# Patient Record
Sex: MALE | Race: BLACK OR AFRICAN AMERICAN | NOT HISPANIC OR LATINO | Employment: OTHER | URBAN - METROPOLITAN AREA
[De-identification: names, ages, dates, MRNs, and addresses within clinical notes are randomized per-mention and may not be internally consistent; named-entity substitution may affect disease eponyms.]

---

## 2023-05-26 PROBLEM — I25.5 CARDIOMYOPATHY, ISCHEMIC: Status: ACTIVE | Noted: 2023-05-26

## 2023-05-26 PROBLEM — R09.81 NASAL CONGESTION: Status: ACTIVE | Noted: 2023-05-26

## 2023-05-26 PROBLEM — G56.01 ACUTE CARPAL TUNNEL SYNDROME, RIGHT: Status: ACTIVE | Noted: 2023-05-26

## 2023-05-26 PROBLEM — E03.9 HYPOTHYROIDISM: Status: ACTIVE | Noted: 2023-05-26

## 2023-05-26 PROBLEM — G47.00 INSOMNIA: Status: ACTIVE | Noted: 2023-05-26

## 2023-05-26 PROBLEM — J44.9 COPD (CHRONIC OBSTRUCTIVE PULMONARY DISEASE) (MULTI): Status: ACTIVE | Noted: 2023-05-26

## 2023-05-26 PROBLEM — G47.30 APNEA, SLEEP: Status: ACTIVE | Noted: 2023-05-26

## 2023-05-26 PROBLEM — H90.3 BILATERAL SENSORINEURAL HEARING LOSS: Status: ACTIVE | Noted: 2023-05-26

## 2023-05-26 PROBLEM — R20.2 TINGLING: Status: ACTIVE | Noted: 2023-05-26

## 2023-05-26 PROBLEM — M50.90 DISORDER OF INTERVERTEBRAL DISC OF CERVICAL SPINE: Status: ACTIVE | Noted: 2023-05-26

## 2023-05-26 PROBLEM — R60.9 EDEMA: Status: ACTIVE | Noted: 2023-05-26

## 2023-05-26 PROBLEM — I25.119 ATHEROSCLEROSIS OF NATIVE CORONARY ARTERY OF NATIVE HEART WITH ANGINA PECTORIS (CMS-HCC): Status: ACTIVE | Noted: 2023-05-26

## 2023-05-26 PROBLEM — E11.42 DIABETIC POLYNEUROPATHY ASSOCIATED WITH TYPE 2 DIABETES MELLITUS (MULTI): Status: ACTIVE | Noted: 2023-05-26

## 2023-05-26 PROBLEM — R20.0 ARM NUMBNESS: Status: ACTIVE | Noted: 2023-05-26

## 2023-05-26 PROBLEM — I25.10 CORONARY ARTERY DISEASE: Status: ACTIVE | Noted: 2023-05-26

## 2023-05-26 PROBLEM — R20.2 PARESTHESIA OF BOTH HANDS: Status: ACTIVE | Noted: 2023-05-26

## 2023-05-26 PROBLEM — I10 ESSENTIAL HYPERTENSION: Status: ACTIVE | Noted: 2023-05-26

## 2023-05-26 PROBLEM — N18.30 CHRONIC KIDNEY DISEASE (CKD), STAGE III (MODERATE) (MULTI): Status: ACTIVE | Noted: 2023-05-26

## 2023-05-26 PROBLEM — R29.818 SUSPECTED SLEEP APNEA: Status: ACTIVE | Noted: 2023-05-26

## 2023-05-26 PROBLEM — M54.12 LEFT CERVICAL RADICULOPATHY: Status: ACTIVE | Noted: 2023-05-26

## 2023-05-26 PROBLEM — E05.00 GRAVES' ORBITOPATHY: Status: ACTIVE | Noted: 2023-05-26

## 2023-05-26 PROBLEM — E66.09 OBESITY DUE TO EXCESS CALORIES: Status: ACTIVE | Noted: 2023-05-26

## 2023-05-26 PROBLEM — R43.8 DECREASED SENSE OF SMELL: Status: ACTIVE | Noted: 2023-05-26

## 2023-05-26 PROBLEM — M47.12 CERVICAL SPONDYLOSIS WITH MYELOPATHY: Status: ACTIVE | Noted: 2023-05-26

## 2023-05-26 PROBLEM — E55.9 VITAMIN D DEFICIENCY: Status: ACTIVE | Noted: 2023-05-26

## 2023-05-26 PROBLEM — R41.3 MEMORY LOSS: Status: ACTIVE | Noted: 2023-05-26

## 2023-05-26 PROBLEM — J34.2 DEVIATED NASAL SEPTUM: Status: ACTIVE | Noted: 2023-05-26

## 2023-05-26 PROBLEM — E11.9 DIABETES MELLITUS (MULTI): Status: ACTIVE | Noted: 2023-05-26

## 2023-05-26 PROBLEM — M54.9 BACK PAIN, CHRONIC: Status: ACTIVE | Noted: 2023-05-26

## 2023-05-26 PROBLEM — E78.5 HYPERLIPIDEMIA: Status: ACTIVE | Noted: 2023-05-26

## 2023-05-26 PROBLEM — J34.89 NASAL OBSTRUCTION: Status: ACTIVE | Noted: 2023-05-26

## 2023-05-26 PROBLEM — R93.0 ABNORMAL MRI OF HEAD: Status: ACTIVE | Noted: 2023-05-26

## 2023-05-26 PROBLEM — N52.9 ORGANIC IMPOTENCE: Status: ACTIVE | Noted: 2023-05-26

## 2023-05-26 PROBLEM — G95.89 CERVICAL CORD MYELOMALACIA (MULTI): Status: ACTIVE | Noted: 2023-05-26

## 2023-05-26 PROBLEM — G89.29 BACK PAIN, CHRONIC: Status: ACTIVE | Noted: 2023-05-26

## 2023-05-26 PROBLEM — I20.9 ANGINA PECTORIS (CMS-HCC): Status: ACTIVE | Noted: 2023-05-26

## 2023-05-26 PROBLEM — J31.0 CHRONIC RHINITIS: Status: ACTIVE | Noted: 2023-05-26

## 2023-05-26 PROBLEM — N28.9 RENAL INSUFFICIENCY: Status: ACTIVE | Noted: 2023-05-26

## 2023-05-26 PROBLEM — R06.02 SOB (SHORTNESS OF BREATH): Status: ACTIVE | Noted: 2023-05-26

## 2023-05-26 PROBLEM — R06.00 DYSPNEA: Status: ACTIVE | Noted: 2023-05-26

## 2023-05-26 RX ORDER — LISINOPRIL 20 MG/1
1 TABLET ORAL DAILY
COMMUNITY
Start: 2012-10-05 | End: 2023-05-31 | Stop reason: SDUPTHER

## 2023-05-26 RX ORDER — METFORMIN HYDROCHLORIDE 500 MG/1
1 TABLET, EXTENDED RELEASE ORAL
COMMUNITY
Start: 2012-10-22 | End: 2023-05-31 | Stop reason: SDUPTHER

## 2023-05-26 RX ORDER — ALBUTEROL SULFATE 90 UG/1
2 AEROSOL, METERED RESPIRATORY (INHALATION) EVERY 6 HOURS PRN
COMMUNITY
Start: 2018-11-07 | End: 2023-05-31 | Stop reason: SDUPTHER

## 2023-05-26 RX ORDER — ALBUTEROL SULFATE 0.83 MG/ML
2.5 SOLUTION RESPIRATORY (INHALATION) EVERY 8 HOURS PRN
COMMUNITY
Start: 2018-11-27 | End: 2023-05-31 | Stop reason: SDUPTHER

## 2023-05-26 RX ORDER — LEVOTHYROXINE SODIUM 50 UG/1
1 TABLET ORAL DAILY
COMMUNITY
Start: 2013-07-09 | End: 2023-05-31 | Stop reason: SDUPTHER

## 2023-05-26 RX ORDER — EZETIMIBE 10 MG/1
1 TABLET ORAL DAILY
COMMUNITY
Start: 2019-10-29 | End: 2023-05-31 | Stop reason: SDUPTHER

## 2023-05-26 RX ORDER — NAPROXEN SODIUM 220 MG/1
1 TABLET, FILM COATED ORAL DAILY
COMMUNITY
Start: 2021-02-02

## 2023-05-26 RX ORDER — METOPROLOL SUCCINATE 50 MG/1
1 TABLET, EXTENDED RELEASE ORAL DAILY
COMMUNITY
Start: 2013-04-16 | End: 2023-05-31 | Stop reason: SDUPTHER

## 2023-05-26 RX ORDER — MINERAL OIL
180 ENEMA (ML) RECTAL DAILY
COMMUNITY
Start: 2023-02-15

## 2023-05-26 RX ORDER — FUROSEMIDE 20 MG/1
1 TABLET ORAL DAILY
COMMUNITY
Start: 2014-08-27 | End: 2023-05-31 | Stop reason: SDUPTHER

## 2023-05-26 RX ORDER — ATORVASTATIN CALCIUM 80 MG/1
1 TABLET, FILM COATED ORAL DAILY
COMMUNITY
Start: 2013-04-16 | End: 2023-05-31 | Stop reason: SDUPTHER

## 2023-05-26 RX ORDER — GABAPENTIN 300 MG/1
1 CAPSULE ORAL 2 TIMES DAILY
COMMUNITY
Start: 2021-11-29 | End: 2023-05-31 | Stop reason: SDUPTHER

## 2023-05-26 RX ORDER — DULOXETIN HYDROCHLORIDE 30 MG/1
1 CAPSULE, DELAYED RELEASE ORAL DAILY
COMMUNITY
Start: 2023-02-15 | End: 2023-05-31 | Stop reason: ALTCHOICE

## 2023-05-31 ENCOUNTER — LAB (OUTPATIENT)
Dept: LAB | Facility: LAB | Age: 83
End: 2023-05-31
Payer: MEDICARE

## 2023-05-31 ENCOUNTER — OFFICE VISIT (OUTPATIENT)
Dept: PRIMARY CARE | Facility: CLINIC | Age: 83
End: 2023-05-31
Payer: MEDICARE

## 2023-05-31 VITALS — WEIGHT: 225 LBS | SYSTOLIC BLOOD PRESSURE: 94 MMHG | DIASTOLIC BLOOD PRESSURE: 62 MMHG | BODY MASS INDEX: 37.44 KG/M2

## 2023-05-31 DIAGNOSIS — J44.9 CHRONIC OBSTRUCTIVE PULMONARY DISEASE, UNSPECIFIED COPD TYPE (MULTI): ICD-10-CM

## 2023-05-31 DIAGNOSIS — E11.9 TYPE 2 DIABETES MELLITUS WITHOUT COMPLICATION, WITHOUT LONG-TERM CURRENT USE OF INSULIN (MULTI): Primary | ICD-10-CM

## 2023-05-31 DIAGNOSIS — F03.90 DEMENTIA, UNSPECIFIED DEMENTIA SEVERITY, UNSPECIFIED DEMENTIA TYPE, UNSPECIFIED WHETHER BEHAVIORAL, PSYCHOTIC, OR MOOD DISTURBANCE OR ANXIETY (MULTI): ICD-10-CM

## 2023-05-31 DIAGNOSIS — E78.5 HYPERLIPIDEMIA, UNSPECIFIED HYPERLIPIDEMIA TYPE: ICD-10-CM

## 2023-05-31 DIAGNOSIS — I10 PRIMARY HYPERTENSION: ICD-10-CM

## 2023-05-31 DIAGNOSIS — E11.9 TYPE 2 DIABETES MELLITUS WITHOUT COMPLICATION, WITHOUT LONG-TERM CURRENT USE OF INSULIN (MULTI): ICD-10-CM

## 2023-05-31 DIAGNOSIS — F32.A DEPRESSION, UNSPECIFIED DEPRESSION TYPE: ICD-10-CM

## 2023-05-31 DIAGNOSIS — E03.8 OTHER SPECIFIED HYPOTHYROIDISM: ICD-10-CM

## 2023-05-31 DIAGNOSIS — I50.20 HFREF (HEART FAILURE WITH REDUCED EJECTION FRACTION) (MULTI): ICD-10-CM

## 2023-05-31 LAB
ALANINE AMINOTRANSFERASE (SGPT) (U/L) IN SER/PLAS: 9 U/L (ref 10–52)
ALBUMIN (G/DL) IN SER/PLAS: 4.1 G/DL (ref 3.4–5)
ALBUMIN (MG/L) IN URINE: 8.1 MG/L
ALBUMIN/CREATININE (UG/MG) IN URINE: 6.2 UG/MG CRT (ref 0–30)
ALKALINE PHOSPHATASE (U/L) IN SER/PLAS: 84 U/L (ref 33–136)
ANION GAP IN SER/PLAS: 12 MMOL/L (ref 10–20)
ASPARTATE AMINOTRANSFERASE (SGOT) (U/L) IN SER/PLAS: 13 U/L (ref 9–39)
BILIRUBIN TOTAL (MG/DL) IN SER/PLAS: 0.3 MG/DL (ref 0–1.2)
CALCIUM (MG/DL) IN SER/PLAS: 9.5 MG/DL (ref 8.6–10.6)
CARBON DIOXIDE, TOTAL (MMOL/L) IN SER/PLAS: 29 MMOL/L (ref 21–32)
CHLORIDE (MMOL/L) IN SER/PLAS: 106 MMOL/L (ref 98–107)
CREATININE (MG/DL) IN SER/PLAS: 1.31 MG/DL (ref 0.5–1.3)
CREATININE (MG/DL) IN URINE: 130 MG/DL (ref 20–370)
ESTIMATED AVERAGE GLUCOSE FOR HBA1C: 157 MG/DL
GFR MALE: 55 ML/MIN/1.73M2
GLUCOSE (MG/DL) IN SER/PLAS: 104 MG/DL (ref 74–99)
HEMOGLOBIN A1C/HEMOGLOBIN TOTAL IN BLOOD: 7.1 %
POTASSIUM (MMOL/L) IN SER/PLAS: 4.9 MMOL/L (ref 3.5–5.3)
PROTEIN TOTAL: 7.1 G/DL (ref 6.4–8.2)
SODIUM (MMOL/L) IN SER/PLAS: 142 MMOL/L (ref 136–145)
UREA NITROGEN (MG/DL) IN SER/PLAS: 28 MG/DL (ref 6–23)

## 2023-05-31 PROCEDURE — 1159F MED LIST DOCD IN RCRD: CPT | Performed by: INTERNAL MEDICINE

## 2023-05-31 PROCEDURE — 82570 ASSAY OF URINE CREATININE: CPT

## 2023-05-31 PROCEDURE — 3074F SYST BP LT 130 MM HG: CPT | Performed by: INTERNAL MEDICINE

## 2023-05-31 PROCEDURE — 80053 COMPREHEN METABOLIC PANEL: CPT

## 2023-05-31 PROCEDURE — 82043 UR ALBUMIN QUANTITATIVE: CPT

## 2023-05-31 PROCEDURE — 83036 HEMOGLOBIN GLYCOSYLATED A1C: CPT

## 2023-05-31 PROCEDURE — 3078F DIAST BP <80 MM HG: CPT | Performed by: INTERNAL MEDICINE

## 2023-05-31 PROCEDURE — 99215 OFFICE O/P EST HI 40 MIN: CPT | Performed by: INTERNAL MEDICINE

## 2023-05-31 PROCEDURE — 86592 SYPHILIS TEST NON-TREP QUAL: CPT

## 2023-05-31 PROCEDURE — 36415 COLL VENOUS BLD VENIPUNCTURE: CPT

## 2023-05-31 RX ORDER — METOPROLOL SUCCINATE 50 MG/1
50 TABLET, EXTENDED RELEASE ORAL DAILY
Qty: 90 TABLET | Refills: 1 | Status: SHIPPED | OUTPATIENT
Start: 2023-05-31 | End: 2023-09-27 | Stop reason: SDUPTHER

## 2023-05-31 RX ORDER — DULOXETIN HYDROCHLORIDE 30 MG/1
30 CAPSULE, DELAYED RELEASE ORAL DAILY
Qty: 90 CAPSULE | Refills: 1 | Status: SHIPPED | OUTPATIENT
Start: 2023-05-31 | End: 2023-09-27

## 2023-05-31 RX ORDER — GABAPENTIN 300 MG/1
300 CAPSULE ORAL 2 TIMES DAILY
Qty: 90 CAPSULE | Refills: 1 | Status: SHIPPED | OUTPATIENT
Start: 2023-05-31 | End: 2023-09-27 | Stop reason: SDUPTHER

## 2023-05-31 RX ORDER — FUROSEMIDE 20 MG/1
20 TABLET ORAL DAILY PRN
Qty: 90 TABLET | Refills: 1 | Status: SHIPPED | OUTPATIENT
Start: 2023-05-31 | End: 2023-09-27 | Stop reason: SDUPTHER

## 2023-05-31 RX ORDER — ATORVASTATIN CALCIUM 80 MG/1
80 TABLET, FILM COATED ORAL DAILY
Qty: 90 TABLET | Refills: 1 | Status: SHIPPED | OUTPATIENT
Start: 2023-05-31 | End: 2023-09-27 | Stop reason: SDUPTHER

## 2023-05-31 RX ORDER — METFORMIN HYDROCHLORIDE 500 MG/1
500 TABLET, EXTENDED RELEASE ORAL
Qty: 90 TABLET | Refills: 1 | Status: SHIPPED | OUTPATIENT
Start: 2023-05-31 | End: 2023-09-27 | Stop reason: SDUPTHER

## 2023-05-31 RX ORDER — LEVOTHYROXINE SODIUM 50 UG/1
50 TABLET ORAL DAILY
Qty: 90 TABLET | Refills: 1 | Status: SHIPPED | OUTPATIENT
Start: 2023-05-31 | End: 2023-09-27 | Stop reason: SDUPTHER

## 2023-05-31 RX ORDER — EZETIMIBE 10 MG/1
10 TABLET ORAL DAILY
Qty: 90 TABLET | Refills: 1 | Status: SHIPPED | OUTPATIENT
Start: 2023-05-31 | End: 2023-09-27 | Stop reason: SDUPTHER

## 2023-05-31 RX ORDER — ALBUTEROL SULFATE 0.83 MG/ML
2.5 SOLUTION RESPIRATORY (INHALATION) EVERY 8 HOURS PRN
Qty: 75 ML | Refills: 1 | Status: SHIPPED | OUTPATIENT
Start: 2023-05-31

## 2023-05-31 RX ORDER — LISINOPRIL 20 MG/1
20 TABLET ORAL DAILY
Qty: 90 TABLET | Refills: 1 | Status: SHIPPED | OUTPATIENT
Start: 2023-05-31 | End: 2023-09-27 | Stop reason: SDUPTHER

## 2023-05-31 ASSESSMENT — ENCOUNTER SYMPTOMS
SHORTNESS OF BREATH: 0
ABDOMINAL PAIN: 0
COUGH: 0
APPETITE CHANGE: 0
CHEST TIGHTNESS: 0
WHEEZING: 0
ABDOMINAL DISTENTION: 0
ACTIVITY CHANGE: 0

## 2023-05-31 ASSESSMENT — PATIENT HEALTH QUESTIONNAIRE - PHQ9
1. LITTLE INTEREST OR PLEASURE IN DOING THINGS: NOT AT ALL
SUM OF ALL RESPONSES TO PHQ9 QUESTIONS 1 AND 2: 0
2. FEELING DOWN, DEPRESSED OR HOPELESS: NOT AT ALL

## 2023-05-31 NOTE — PROGRESS NOTES
Subjective   Patient ID: Baldev Osorio is a 82 y.o. male who presents for Follow-up and Med Refill.  HPI    Patient presents to the clinic today for a follow up visit. He is doing well. He does mention that over the last few months he has been getting more forgetful. It is primarily his short term memory that has been impacted. He also continues to deal with the chronic numbness and tingling in his arm. He has followed up with the physician who did the procedure who said that this is a chronic thing that he will deal with.     Review of Systems   Constitutional:  Negative for activity change and appetite change.   Respiratory:  Negative for cough, chest tightness, shortness of breath and wheezing.    Cardiovascular:  Negative for chest pain.   Gastrointestinal:  Negative for abdominal distention and abdominal pain.       Objective   BP 94/62   Wt 102 kg (225 lb)   BMI 37.44 kg/m²     Physical Exam  Constitutional:       Appearance: Normal appearance.   Cardiovascular:      Rate and Rhythm: Normal rate and regular rhythm.   Pulmonary:      Effort: Pulmonary effort is normal.      Breath sounds: Normal breath sounds.   Abdominal:      General: Abdomen is flat. Bowel sounds are normal.      Palpations: Abdomen is soft.   Neurological:      Mental Status: He is alert.         Assessment/Plan          # Short Term Memory Loss  - MMSE Score of 17  [ ] RPR  [ ] Neuropsych referral    # HTN  - BP in office: 94/62  - Current Regimen: Lisinopril 20 mg QD, Metoprolol Succinate ER 50 mg every day, Lasix 20 mg QD  - Continue to monitor BP, daily BP's   [ ] Switch Lasix to PRN due to soft BP's  [ ] CMP, Urine Albumin today    # CKD Stage 3a, A1   - Last Cr 1.56, GFR 45 (11/2022)  - Outpatient Nephrologist: Dr. Díaz     # HFmrEF  - ECHO (7/2020): EF 45-50%  - Current Diuretic Regimen: Lasix 20 mg QD  - Beta Blocker: Metoprolol Succinate ER 50 mg QD   - ACE/ARB: Lisinopril 20 mg QD   - Outpatient Cardiologist: Dr. Roe  Sonal   [ ] Switch Lasix to PRN  [ ] ECHO pending      # T2DM  - IO HbA1c (2/2023): 7.0%   - Current Regimen: Metformin HCl  mg QD   [ ] HbA1c today     # Left Arm Numbness/Tingling/Pain  # Elevated PHQ-9 Score   - Has been ongoing for years  - Cervical Laminectomy in January of 2021, pain has persisted  - Currently on Gabapentin 100 mg BID     # HLD  - Last Lipid Panel (8/2022): Cholesterol 111, LDL 49, TG's 77  - Continue Atorvastatin 80 mg QD, Ezetimibe 10 mg QD     # Hypothyroidism  - TSH (10/2022): 2.19  - Continue Levothyroxine 50 mcg QD    # Depression  - Continue Duloxetine 30 mg QD      Labs:  - CBC, CMP, TSH w/ T4, Lipid Panel, Vitamin D, HbA1c (Yearly Labs 9/2023)  - CMP, Urine Albumin, HbA1c due today      Routine Screening:  - Colonoscopy: Last done 6/2017, one 1 mm polyp in the cecum removed, one 5 mm polyp in the sigmoid colon removed, otherwise normal. Repeat in 5-10 years.      Immunizations:   - Pneumococcal: PPSV 2008  - Shingles: none on file, recommended to get this done at pharmacy  - TDAP: none on file  - Influenza: none on file      Please follow up in 3 months.

## 2023-05-31 NOTE — PATIENT INSTRUCTIONS
Thank you for making an appointment with Dr. Sage/Dr. William today.     By optimizing your health through good nutrition, daily exercise, stress management, low/moderate alcohol and avoidance of tobacco, sugar and processed foods, you can help to decrease your risk of cardiovascular disease and stroke and achieve a healthy weight. A diet rich in whole, plant-based foods such as vegetables, beans, seeds, nuts, whole grains, healthy fats and fruit - leads to lower body mass index, blood pressure, HbA1C, and cholesterol levels.     Please continue to take medications as prescribed.     Please have blood work done, thanks!

## 2023-05-31 NOTE — PROGRESS NOTES
I reviewed with the resident the medical history and the resident’s findings on physical examination.  I discussed with the resident the patient’s diagnosis and concur with the treatment plan as documented in the resident note.     I saw and evaluated the patient. I personally obtained the key and critical portions of the history and physical exam or was physically present for key and critical portions performed by the trainee. I reviewed the trainee's documentation and discussed the patient with the trainee. I agree with the trainee's medical decision making, as documented on the trainee's note.     Nataliia Richardson MD

## 2023-06-01 LAB — RPR MONITORING: NONREACTIVE

## 2023-06-20 DIAGNOSIS — E11.9 TYPE 2 DIABETES MELLITUS WITHOUT COMPLICATION, WITHOUT LONG-TERM CURRENT USE OF INSULIN (MULTI): ICD-10-CM

## 2023-06-20 RX ORDER — BLOOD SUGAR DIAGNOSTIC
1 STRIP MISCELLANEOUS DAILY
COMMUNITY
End: 2023-06-20 | Stop reason: SDUPTHER

## 2023-06-20 RX ORDER — LANCETS 26 GAUGE
1 EACH MISCELLANEOUS DAILY
Qty: 90 EACH | Refills: 3 | Status: SHIPPED | OUTPATIENT
Start: 2023-06-20 | End: 2023-09-27 | Stop reason: SDUPTHER

## 2023-06-20 RX ORDER — LANCETS 26 GAUGE
1 EACH MISCELLANEOUS DAILY
COMMUNITY
End: 2023-06-20 | Stop reason: SDUPTHER

## 2023-06-20 RX ORDER — BLOOD SUGAR DIAGNOSTIC
1 STRIP MISCELLANEOUS DAILY
Qty: 100 STRIP | Refills: 3 | Status: SHIPPED | OUTPATIENT
Start: 2023-06-20 | End: 2023-09-27 | Stop reason: SDUPTHER

## 2023-06-20 RX ORDER — DEXTROSE 4 G
TABLET,CHEWABLE ORAL
Qty: 1 EACH | Refills: 0 | Status: SHIPPED | OUTPATIENT
Start: 2023-06-20 | End: 2023-09-27 | Stop reason: SDUPTHER

## 2023-09-13 ENCOUNTER — TELEPHONE (OUTPATIENT)
Dept: PHARMACY | Facility: HOSPITAL | Age: 83
End: 2023-09-13
Payer: MEDICARE

## 2023-09-14 ENCOUNTER — TELEPHONE (OUTPATIENT)
Dept: PHARMACY | Facility: HOSPITAL | Age: 83
End: 2023-09-14
Payer: MEDICARE

## 2023-09-14 NOTE — TELEPHONE ENCOUNTER
Population Health: Outreach by Ambulatory Pharmacy Team    Payor: [employer]  Reason: Adherence  Medication: Metformin 500mg, Atorvastatin 80mg, and Lisinopril 20mg  Outcome: Patient Reached: Claims Adherence, patient still has medication and has it mailed to them when needed, no issues    HERBERT GANN, PharmD

## 2023-09-15 PROBLEM — M47.22 CERVICAL SPONDYLOSIS WITH RADICULOPATHY: Status: ACTIVE | Noted: 2021-01-25

## 2023-09-15 PROBLEM — E66.9 OBESITY: Status: ACTIVE | Noted: 2021-01-25

## 2023-09-15 PROBLEM — E78.5 HLD (HYPERLIPIDEMIA): Status: ACTIVE | Noted: 2021-01-25

## 2023-09-15 PROBLEM — I12.9 HYPERTENSIVE CHRONIC KIDNEY DISEASE W STG 1-4/UNSP CHR KDNY: Status: ACTIVE | Noted: 2021-01-25

## 2023-09-15 PROBLEM — E11.22 TYPE 2 DIABETES MELLITUS WITH DIABETIC CHRONIC KIDNEY DISEASE (MULTI): Status: ACTIVE | Noted: 2021-01-25

## 2023-09-15 PROBLEM — Z87.891 PERSONAL HISTORY OF NICOTINE DEPENDENCE: Status: ACTIVE | Noted: 2021-01-25

## 2023-09-15 PROBLEM — Z95.5 PRESENCE OF CORONARY ANGIOPLASTY IMPLANT AND GRAFT: Status: ACTIVE | Noted: 2021-01-25

## 2023-09-15 PROBLEM — I25.10 ATHSCL HEART DISEASE OF NATIVE CORONARY ARTERY W/O ANG PCTRS: Status: ACTIVE | Noted: 2021-01-25

## 2023-09-15 PROBLEM — I25.2 OLD MYOCARDIAL INFARCTION: Status: ACTIVE | Noted: 2021-01-25

## 2023-09-15 PROBLEM — M54.12 CHRONIC CERVICAL RADICULOPATHY: Status: ACTIVE | Noted: 2023-09-15

## 2023-09-15 PROBLEM — G51.0 BELL'S PALSY: Status: ACTIVE | Noted: 2021-01-11

## 2023-09-15 PROBLEM — Z79.84 LONG TERM (CURRENT) USE OF ORAL HYPOGLYCEMIC DRUGS: Status: ACTIVE | Noted: 2021-01-25

## 2023-09-15 PROBLEM — N18.30 STAGE 3 CHRONIC KIDNEY DISEASE (MULTI): Status: ACTIVE | Noted: 2021-01-25

## 2023-09-15 PROBLEM — I25.5 ISCHEMIC CARDIOMYOPATHY: Status: ACTIVE | Noted: 2021-01-25

## 2023-09-15 PROBLEM — H90.5 SENSORINEURAL HEARING LOSS: Status: ACTIVE | Noted: 2021-01-25

## 2023-09-15 RX ORDER — ALBUTEROL SULFATE 90 UG/1
2 AEROSOL, METERED RESPIRATORY (INHALATION)
COMMUNITY
Start: 2018-11-07

## 2023-09-18 NOTE — TELEPHONE ENCOUNTER
I reviewed the progress note and agree with the resident’s findings and plans as written. Case discussed with resident.    Faraz Kohli, PharmD

## 2023-09-27 ENCOUNTER — OFFICE VISIT (OUTPATIENT)
Dept: PRIMARY CARE | Facility: CLINIC | Age: 83
End: 2023-09-27
Payer: MEDICARE

## 2023-09-27 VITALS — SYSTOLIC BLOOD PRESSURE: 114 MMHG | BODY MASS INDEX: 37.94 KG/M2 | WEIGHT: 228 LBS | DIASTOLIC BLOOD PRESSURE: 66 MMHG

## 2023-09-27 DIAGNOSIS — E78.5 HYPERLIPIDEMIA, UNSPECIFIED HYPERLIPIDEMIA TYPE: ICD-10-CM

## 2023-09-27 DIAGNOSIS — N18.31 STAGE 3A CHRONIC KIDNEY DISEASE (MULTI): ICD-10-CM

## 2023-09-27 DIAGNOSIS — E66.09 CLASS 1 OBESITY DUE TO EXCESS CALORIES IN ADULT, UNSPECIFIED BMI, UNSPECIFIED WHETHER SERIOUS COMORBIDITY PRESENT: Primary | ICD-10-CM

## 2023-09-27 DIAGNOSIS — E66.01 OBESITY, MORBID (MULTI): ICD-10-CM

## 2023-09-27 DIAGNOSIS — I10 PRIMARY HYPERTENSION: ICD-10-CM

## 2023-09-27 DIAGNOSIS — E11.9 TYPE 2 DIABETES MELLITUS WITHOUT COMPLICATION, WITHOUT LONG-TERM CURRENT USE OF INSULIN (MULTI): ICD-10-CM

## 2023-09-27 DIAGNOSIS — I10 ESSENTIAL HYPERTENSION: ICD-10-CM

## 2023-09-27 DIAGNOSIS — E03.8 OTHER SPECIFIED HYPOTHYROIDISM: ICD-10-CM

## 2023-09-27 DIAGNOSIS — N52.1 ERECTILE DYSFUNCTION DUE TO DISEASES CLASSIFIED ELSEWHERE: ICD-10-CM

## 2023-09-27 DIAGNOSIS — E11.22 TYPE 2 DIABETES MELLITUS WITH STAGE 3A CHRONIC KIDNEY DISEASE, WITHOUT LONG-TERM CURRENT USE OF INSULIN (MULTI): ICD-10-CM

## 2023-09-27 DIAGNOSIS — N18.31 TYPE 2 DIABETES MELLITUS WITH STAGE 3A CHRONIC KIDNEY DISEASE, WITHOUT LONG-TERM CURRENT USE OF INSULIN (MULTI): ICD-10-CM

## 2023-09-27 DIAGNOSIS — E78.2 MIXED HYPERLIPIDEMIA: ICD-10-CM

## 2023-09-27 DIAGNOSIS — I50.20 HFREF (HEART FAILURE WITH REDUCED EJECTION FRACTION) (MULTI): ICD-10-CM

## 2023-09-27 PROCEDURE — 1160F RVW MEDS BY RX/DR IN RCRD: CPT | Performed by: INTERNAL MEDICINE

## 2023-09-27 PROCEDURE — 1159F MED LIST DOCD IN RCRD: CPT | Performed by: INTERNAL MEDICINE

## 2023-09-27 PROCEDURE — 99214 OFFICE O/P EST MOD 30 MIN: CPT | Performed by: INTERNAL MEDICINE

## 2023-09-27 PROCEDURE — 3078F DIAST BP <80 MM HG: CPT | Performed by: INTERNAL MEDICINE

## 2023-09-27 PROCEDURE — 3074F SYST BP LT 130 MM HG: CPT | Performed by: INTERNAL MEDICINE

## 2023-09-27 PROCEDURE — 1036F TOBACCO NON-USER: CPT | Performed by: INTERNAL MEDICINE

## 2023-09-27 RX ORDER — LEVOTHYROXINE SODIUM 50 UG/1
50 TABLET ORAL DAILY
Qty: 90 TABLET | Refills: 1 | Status: SHIPPED | OUTPATIENT
Start: 2023-09-27 | End: 2023-10-17 | Stop reason: SDUPTHER

## 2023-09-27 RX ORDER — ATORVASTATIN CALCIUM 80 MG/1
80 TABLET, FILM COATED ORAL DAILY
Qty: 90 TABLET | Refills: 1 | Status: SHIPPED | OUTPATIENT
Start: 2023-09-27

## 2023-09-27 RX ORDER — EZETIMIBE 10 MG/1
10 TABLET ORAL DAILY
Qty: 90 TABLET | Refills: 1 | Status: SHIPPED | OUTPATIENT
Start: 2023-09-27

## 2023-09-27 RX ORDER — BLOOD SUGAR DIAGNOSTIC
1 STRIP MISCELLANEOUS DAILY
Qty: 100 STRIP | Refills: 3 | Status: SHIPPED | OUTPATIENT
Start: 2023-09-27 | End: 2023-10-02 | Stop reason: ALTCHOICE

## 2023-09-27 RX ORDER — LISINOPRIL 20 MG/1
20 TABLET ORAL DAILY
Qty: 90 TABLET | Refills: 1 | Status: SHIPPED | OUTPATIENT
Start: 2023-09-27

## 2023-09-27 RX ORDER — LANCETS 26 GAUGE
1 EACH MISCELLANEOUS DAILY
Qty: 90 EACH | Refills: 3 | Status: SHIPPED | OUTPATIENT
Start: 2023-09-27 | End: 2023-10-02 | Stop reason: ALTCHOICE

## 2023-09-27 RX ORDER — DEXTROSE 4 G
TABLET,CHEWABLE ORAL
Qty: 1 EACH | Refills: 0 | Status: SHIPPED | OUTPATIENT
Start: 2023-09-27 | End: 2023-10-02 | Stop reason: ALTCHOICE

## 2023-09-27 RX ORDER — METFORMIN HYDROCHLORIDE 500 MG/1
500 TABLET, EXTENDED RELEASE ORAL
Qty: 90 TABLET | Refills: 1 | Status: SHIPPED | OUTPATIENT
Start: 2023-09-27

## 2023-09-27 RX ORDER — METOPROLOL SUCCINATE 50 MG/1
50 TABLET, EXTENDED RELEASE ORAL DAILY
Qty: 90 TABLET | Refills: 1 | Status: SHIPPED | OUTPATIENT
Start: 2023-09-27

## 2023-09-27 RX ORDER — GABAPENTIN 300 MG/1
300 CAPSULE ORAL 2 TIMES DAILY
Qty: 90 CAPSULE | Refills: 1 | Status: SHIPPED | OUTPATIENT
Start: 2023-09-27

## 2023-09-27 RX ORDER — FUROSEMIDE 20 MG/1
20 TABLET ORAL DAILY PRN
Qty: 90 TABLET | Refills: 1 | Status: SHIPPED | OUTPATIENT
Start: 2023-09-27

## 2023-09-27 RX ORDER — SILDENAFIL 50 MG/1
25 TABLET, FILM COATED ORAL DAILY PRN
Qty: 12 TABLET | Refills: 3 | Status: SHIPPED | OUTPATIENT
Start: 2023-09-27 | End: 2024-03-15 | Stop reason: SDUPTHER

## 2023-09-27 ASSESSMENT — ENCOUNTER SYMPTOMS
RESPIRATORY NEGATIVE: 1
CONSTITUTIONAL NEGATIVE: 1
GASTROINTESTINAL NEGATIVE: 1
NUMBNESS: 1
CARDIOVASCULAR NEGATIVE: 1

## 2023-09-27 ASSESSMENT — PATIENT HEALTH QUESTIONNAIRE - PHQ9
SUM OF ALL RESPONSES TO PHQ9 QUESTIONS 1 AND 2: 0
1. LITTLE INTEREST OR PLEASURE IN DOING THINGS: NOT AT ALL
2. FEELING DOWN, DEPRESSED OR HOPELESS: NOT AT ALL

## 2023-09-27 NOTE — PATIENT INSTRUCTIONS
Thank you for making an appointment with Dr. Sage/Dr. William today.     By optimizing your health through good nutrition, daily exercise, stress management, low/moderate alcohol and avoidance of tobacco, sugar and processed foods, you can help to decrease your risk of cardiovascular disease and stroke and achieve a healthy weight. A diet rich in whole, plant-based foods such as vegetables, beans, seeds, nuts, whole grains, healthy fats and fruit - leads to lower body mass index, blood pressure, HbA1C, and cholesterol levels.     Please continue to take medications as prescribed.

## 2023-09-27 NOTE — PROGRESS NOTES
I saw and evaluated the patient. I personally obtained the key and critical portions of the history and physical exam or was physically present for key and critical portions performed by the resident/fellow. I reviewed the resident/fellow's documentation and discussed the patient with the resident/fellow. I agree with the resident/fellow's medical decision making as documented in the note.    I saw and evaluated the patient. I personally obtained the key and critical portions of the history and physical exam or was physically present for key and critical portions performed by the trainee. I reviewed the trainee's documentation and discussed the patient with the trainee. I agree with the trainee's medical decision making, as documented on the trainee's note.     Nataliia Richardson MD

## 2023-09-27 NOTE — PROGRESS NOTES
Subjective   Patient ID: Baldev Osorio is a 83 y.o. male who presents for Follow-up.  HPI  Presents to the clinic today for a follow up visit. He states that he is doing well today overall. He is still dealing with some residual pain in his left arm from the surgery he had previously. He has been following up with his Neurosurgeon who states that the only thing they can do is another surgery since it seems to be a nerve issue. He doesn't want to do another surgery so he says he will continue to manage his pain with the medications. The gabapentin he is taking provides symptomatic relief. He has been having some sleeping issues due to the pain at times but overall he is tolerating the pain well.    Review of Systems   Constitutional: Negative.    Respiratory: Negative.     Cardiovascular: Negative.    Gastrointestinal: Negative.    Neurological:  Positive for numbness.       Objective   /66   Wt 103 kg (228 lb)   BMI 37.94 kg/m²     Physical Exam  Cardiovascular:      Rate and Rhythm: Normal rate and regular rhythm.      Pulses: Normal pulses.      Heart sounds: Normal heart sounds.   Pulmonary:      Effort: Pulmonary effort is normal.      Breath sounds: Normal breath sounds.   Abdominal:      General: Abdomen is flat. Bowel sounds are normal.      Palpations: Abdomen is soft.   Neurological:      Mental Status: He is alert.         Assessment/Plan          # HTN  - Current Regimen: Lisinopril 20 mg QD, Metoprolol Succinate ER 50 mg every day  - Continue to monitor BP, daily BP's      # CKD Stage 3a, A1 (Stable)  - Last Cr 1.31, GFR 55 (5/2023)  - Outpatient Nephrologist: Dr. Díaz     # HFmrEF  - ECHO (6/2023): EF 50-55%, impaired relaxation of LV diastolic filling, increase in calculated LVEF from previous.  - Current Diuretic Regimen: Lasix 20 mg PRN due to soft BP's  - Beta Blocker: Metoprolol Succinate ER 50 mg QD   - ACE/ARB: Lisinopril 20 mg QD   - Outpatient Cardiologist: Dr. Bhupinder Pruitt        # T2DM  - IO HbA1c (5/2023): 7.1%   - Current Regimen: Metformin HCl  mg QD      # Left Arm Numbness/Tingling/Pain  # Elevated PHQ-9 Score   - Has been ongoing for years  - Cervical Laminectomy in January of 2021, pain has persisted  - Currently on Gabapentin 300 mg BID     # HLD  - Last Lipid Panel (8/2022): Cholesterol 111, LDL 49, TG's 77  - Continue Atorvastatin 80 mg QD, Ezetimibe 10 mg QD     # Hypothyroidism  - TSH (10/2022): 2.19  - Continue Levothyroxine 50 mcg QD     # Depression  - Continue Duloxetine 30 mg QD      Labs:  - CBC, CMP, TSH w/ T4, Lipid Panel, Vitamin D, HbA1c (Yearly Labs 11/2023)     Routine Screening:  - Colonoscopy: Last done 6/2017, one 1 mm polyp in the cecum removed, one 5 mm polyp in the sigmoid colon removed, otherwise normal. Repeat in 5-10 years.      Immunizations:   - Pneumococcal: PPSV 2008  - Shingles: none on file, recommended to get this done at pharmacy  - TDAP: none on file  - Influenza: none on file      Please follow up in 3 months.

## 2023-09-28 PROBLEM — I25.119 ATHEROSCLEROSIS OF NATIVE CORONARY ARTERY OF NATIVE HEART WITH ANGINA PECTORIS (CMS-HCC): Status: RESOLVED | Noted: 2023-05-26 | Resolved: 2023-09-28

## 2023-09-28 PROBLEM — I20.9 ANGINA PECTORIS (CMS-HCC): Status: RESOLVED | Noted: 2023-05-26 | Resolved: 2023-09-28

## 2023-09-28 PROBLEM — G95.89 CERVICAL CORD MYELOMALACIA (MULTI): Status: RESOLVED | Noted: 2023-05-26 | Resolved: 2023-09-28

## 2023-10-02 DIAGNOSIS — E11.9 TYPE 2 DIABETES MELLITUS WITHOUT COMPLICATION, WITHOUT LONG-TERM CURRENT USE OF INSULIN (MULTI): ICD-10-CM

## 2023-10-02 RX ORDER — BLOOD SUGAR DIAGNOSTIC
1 STRIP MISCELLANEOUS DAILY
Qty: 100 STRIP | Refills: 3 | Status: SHIPPED | OUTPATIENT
Start: 2023-10-02

## 2023-10-02 RX ORDER — LANCETS 30 GAUGE
1 EACH MISCELLANEOUS DAILY
Qty: 1 EACH | Refills: 0 | Status: SHIPPED | OUTPATIENT
Start: 2023-10-02 | End: 2024-10-01

## 2023-10-02 RX ORDER — LANCETS 33 GAUGE
1 EACH MISCELLANEOUS DAILY
Qty: 100 EACH | Refills: 3 | Status: SHIPPED | OUTPATIENT
Start: 2023-10-02

## 2023-10-17 ENCOUNTER — LAB (OUTPATIENT)
Dept: LAB | Facility: LAB | Age: 83
End: 2023-10-17
Payer: MEDICARE

## 2023-10-17 ENCOUNTER — OFFICE VISIT (OUTPATIENT)
Dept: ENDOCRINOLOGY | Facility: CLINIC | Age: 83
End: 2023-10-17
Payer: MEDICARE

## 2023-10-17 VITALS
RESPIRATION RATE: 16 BRPM | BODY MASS INDEX: 37.65 KG/M2 | DIASTOLIC BLOOD PRESSURE: 64 MMHG | HEIGHT: 65 IN | WEIGHT: 226 LBS | SYSTOLIC BLOOD PRESSURE: 122 MMHG | HEART RATE: 68 BPM

## 2023-10-17 DIAGNOSIS — E05.00 GRAVES' EYE DISEASE: ICD-10-CM

## 2023-10-17 DIAGNOSIS — E11.9 TYPE 2 DIABETES MELLITUS WITHOUT COMPLICATION, WITHOUT LONG-TERM CURRENT USE OF INSULIN (MULTI): ICD-10-CM

## 2023-10-17 DIAGNOSIS — E03.9 HYPOTHYROIDISM, UNSPECIFIED TYPE: ICD-10-CM

## 2023-10-17 DIAGNOSIS — E03.8 OTHER SPECIFIED HYPOTHYROIDISM: ICD-10-CM

## 2023-10-17 DIAGNOSIS — E11.9 TYPE 2 DIABETES MELLITUS WITHOUT COMPLICATION, WITHOUT LONG-TERM CURRENT USE OF INSULIN (MULTI): Primary | ICD-10-CM

## 2023-10-17 LAB
ALBUMIN SERPL BCP-MCNC: 4.2 G/DL (ref 3.4–5)
ALP SERPL-CCNC: 82 U/L (ref 33–136)
ALT SERPL W P-5'-P-CCNC: 10 U/L (ref 10–52)
ANION GAP SERPL CALC-SCNC: 14 MMOL/L (ref 10–20)
AST SERPL W P-5'-P-CCNC: 12 U/L (ref 9–39)
BILIRUB SERPL-MCNC: 0.4 MG/DL (ref 0–1.2)
BUN SERPL-MCNC: 18 MG/DL (ref 6–23)
CALCIUM SERPL-MCNC: 9.6 MG/DL (ref 8.6–10.6)
CHLORIDE SERPL-SCNC: 104 MMOL/L (ref 98–107)
CO2 SERPL-SCNC: 29 MMOL/L (ref 21–32)
CREAT SERPL-MCNC: 1.32 MG/DL (ref 0.5–1.3)
EST. AVERAGE GLUCOSE BLD GHB EST-MCNC: 137 MG/DL
GFR SERPL CREATININE-BSD FRML MDRD: 54 ML/MIN/1.73M*2
GLUCOSE SERPL-MCNC: 107 MG/DL (ref 74–99)
HBA1C MFR BLD: 6.4 %
POTASSIUM SERPL-SCNC: 4.6 MMOL/L (ref 3.5–5.3)
PROT SERPL-MCNC: 7.3 G/DL (ref 6.4–8.2)
SODIUM SERPL-SCNC: 142 MMOL/L (ref 136–145)
T4 FREE SERPL-MCNC: 0.99 NG/DL (ref 0.78–1.48)
TSH SERPL-ACNC: 4.04 MIU/L (ref 0.44–3.98)

## 2023-10-17 PROCEDURE — 99214 OFFICE O/P EST MOD 30 MIN: CPT | Performed by: INTERNAL MEDICINE

## 2023-10-17 PROCEDURE — 3078F DIAST BP <80 MM HG: CPT | Performed by: INTERNAL MEDICINE

## 2023-10-17 PROCEDURE — 1036F TOBACCO NON-USER: CPT | Performed by: INTERNAL MEDICINE

## 2023-10-17 PROCEDURE — 80053 COMPREHEN METABOLIC PANEL: CPT

## 2023-10-17 PROCEDURE — 84443 ASSAY THYROID STIM HORMONE: CPT

## 2023-10-17 PROCEDURE — 3074F SYST BP LT 130 MM HG: CPT | Performed by: INTERNAL MEDICINE

## 2023-10-17 PROCEDURE — 1160F RVW MEDS BY RX/DR IN RCRD: CPT | Performed by: INTERNAL MEDICINE

## 2023-10-17 PROCEDURE — 1159F MED LIST DOCD IN RCRD: CPT | Performed by: INTERNAL MEDICINE

## 2023-10-17 PROCEDURE — 36415 COLL VENOUS BLD VENIPUNCTURE: CPT

## 2023-10-17 PROCEDURE — 83036 HEMOGLOBIN GLYCOSYLATED A1C: CPT

## 2023-10-17 PROCEDURE — 84439 ASSAY OF FREE THYROXINE: CPT

## 2023-10-17 RX ORDER — LEVOTHYROXINE SODIUM 75 UG/1
75 TABLET ORAL DAILY
Qty: 90 TABLET | Refills: 3 | Status: SHIPPED | OUTPATIENT
Start: 2023-10-17 | End: 2024-10-16

## 2023-10-17 RX ORDER — CHOLECALCIFEROL (VITAMIN D3) 25 MCG
1000 TABLET ORAL DAILY
COMMUNITY

## 2023-10-17 RX ORDER — DULOXETIN HYDROCHLORIDE 30 MG/1
30 CAPSULE, DELAYED RELEASE ORAL DAILY
COMMUNITY
End: 2024-02-13 | Stop reason: ALTCHOICE

## 2023-10-17 ASSESSMENT — ENCOUNTER SYMPTOMS
LIGHT-HEADEDNESS: 0
CHILLS: 0
DIZZINESS: 0
VOMITING: 0
SHORTNESS OF BREATH: 0
NAUSEA: 0
DIARRHEA: 0
FEVER: 0

## 2023-10-17 NOTE — PROGRESS NOTES
"Subjective   Patient ID: Baldev Osorio is a 83 y.o. male who presents for Hypothyroidism and Graves' Disease.  HPI  Hypothyroidism s/p clarke for graves also with dm2.  Since last visit a year ago not active or watching carbs. A1c up with last check.   Taking t4 as directed.  C/o eye irritation but stopped using drops which helped in the past.     Review of Systems   Constitutional:  Negative for chills and fever.   Respiratory:  Negative for shortness of breath.    Gastrointestinal:  Negative for diarrhea, nausea and vomiting.   Endocrine: Negative for cold intolerance and heat intolerance.   Neurological:  Negative for dizziness and light-headedness.       Objective   10/17/2023 9:03 AM    /64   Pulse 68   Resp 16   Weight 103 kg (226 lb)   Height 1.651 m (5' 5\")       Physical Exam  Constitutional:       Appearance: Normal appearance. He is overweight.   HENT:      Head: Normocephalic and atraumatic.   Neck:      Thyroid: No thyroid mass, thyromegaly or thyroid tenderness.   Cardiovascular:      Rate and Rhythm: Normal rate and regular rhythm.      Heart sounds: No murmur heard.     No gallop.   Pulmonary:      Effort: Pulmonary effort is normal.      Breath sounds: Normal breath sounds.   Abdominal:      Palpations: Abdomen is soft.      Comments: benign   Neurological:      General: No focal deficit present.      Mental Status: He is alert and oriented to person, place, and time.      Deep Tendon Reflexes: Reflexes are normal and symmetric.   Psychiatric:         Behavior: Behavior is cooperative.         Assessment/Plan   Problem List Items Addressed This Visit             ICD-10-CM    Diabetes mellitus (CMS/HCC) - Primary E11.9    Relevant Orders    Comprehensive Metabolic Panel    Hemoglobin A1C    Hypothyroidism E03.9    Relevant Orders    TSH with reflex to Free T4 if abnormal    Graves' eye disease E05.00   Dm2:  A1c today.  Must work on cutting carbs and increasing exercise  Hypothyroidism:  Tsh " today  Rosie:  Encouraged him to resume eye drops (saline)  Follow up in one year

## 2023-10-17 NOTE — PATIENT INSTRUCTIONS
Blood work today  Cut back on carbs  Start saline eye drops again  Increase activity  Follow up in one year

## 2023-10-18 ENCOUNTER — OFFICE VISIT (OUTPATIENT)
Dept: PSYCHOLOGY | Facility: CLINIC | Age: 83
End: 2023-10-18
Payer: MEDICARE

## 2023-10-18 DIAGNOSIS — F01.A3 MILD VASCULAR DEMENTIA WITH MOOD DISTURBANCE (MULTI): Primary | ICD-10-CM

## 2023-10-18 PROCEDURE — 1159F MED LIST DOCD IN RCRD: CPT | Performed by: CLINICAL NEUROPSYCHOLOGIST

## 2023-10-18 PROCEDURE — 96136 PSYCL/NRPSYC TST PHY/QHP 1ST: CPT | Mod: AH | Performed by: CLINICAL NEUROPSYCHOLOGIST

## 2023-10-18 PROCEDURE — 96137 PSYCL/NRPSYC TST PHY/QHP EA: CPT | Performed by: CLINICAL NEUROPSYCHOLOGIST

## 2023-10-18 PROCEDURE — 3074F SYST BP LT 130 MM HG: CPT | Performed by: CLINICAL NEUROPSYCHOLOGIST

## 2023-10-18 PROCEDURE — 96116 NUBHVL XM PHYS/QHP 1ST HR: CPT | Mod: AH | Performed by: CLINICAL NEUROPSYCHOLOGIST

## 2023-10-18 PROCEDURE — 3078F DIAST BP <80 MM HG: CPT | Performed by: CLINICAL NEUROPSYCHOLOGIST

## 2023-10-18 PROCEDURE — 1036F TOBACCO NON-USER: CPT | Performed by: CLINICAL NEUROPSYCHOLOGIST

## 2023-10-18 PROCEDURE — 1160F RVW MEDS BY RX/DR IN RCRD: CPT | Performed by: CLINICAL NEUROPSYCHOLOGIST

## 2023-10-18 PROCEDURE — 96136 PSYCL/NRPSYC TST PHY/QHP 1ST: CPT | Performed by: CLINICAL NEUROPSYCHOLOGIST

## 2023-10-18 PROCEDURE — 96132 NRPSYC TST EVAL PHYS/QHP 1ST: CPT | Mod: AH | Performed by: CLINICAL NEUROPSYCHOLOGIST

## 2023-10-18 PROCEDURE — 96132 NRPSYC TST EVAL PHYS/QHP 1ST: CPT | Performed by: CLINICAL NEUROPSYCHOLOGIST

## 2023-10-18 PROCEDURE — 96116 NUBHVL XM PHYS/QHP 1ST HR: CPT | Performed by: CLINICAL NEUROPSYCHOLOGIST

## 2023-10-18 PROCEDURE — 96133 NRPSYC TST EVAL PHYS/QHP EA: CPT | Performed by: CLINICAL NEUROPSYCHOLOGIST

## 2023-10-18 PROCEDURE — 96133 NRPSYC TST EVAL PHYS/QHP EA: CPT | Mod: AH | Performed by: CLINICAL NEUROPSYCHOLOGIST

## 2023-10-18 PROCEDURE — 96137 PSYCL/NRPSYC TST PHY/QHP EA: CPT | Mod: AH | Performed by: CLINICAL NEUROPSYCHOLOGIST

## 2023-10-31 NOTE — PROGRESS NOTES
Neuropsychology Evaluation    Name: Baldev Osorio  : 1940  MRN: [id number]  Referring Provider: Dr. Nataliia Richardson     Date of Service: 10/18/2023     Reason for Referral:    Patient presented for neuropsychological testing upon referral by his PCP for input on differential diagnosis of cognitive changes. This evaluation is intended for clinical purposes only and is NOT intended for legal/forensic or disability purposes.   Verbal consent for neuropsychological services was obtained, acknowledging that by coming to a medical facility there is the risk of exposure to the coronavirus during the COVID-19 pandemic. Personal Protective Equipment (PPE) was used per  policy, with patient wearing a paper isolation mask throughout the testing session and the neuropsychologist wearing a paper isolation mask, maintaining 6 feet distance, and wearing a plastic face shield and/or use of plexi glass barrier in high risk encounters.    Diagnosis:  Vascular cognitive impairment, insomnia, chronic pain, ? Depression manifested in irritability     Summary/Impressions:    Testing was administered/interpreted with attention to confounding factors of visual impairment and [place].  Testing was indicative of a mild to moderate degree of impairment in aspects of memory, language, attention/executive functioning, and visuospatial abilities (with confound of visual impairment).  The pattern of memory findings was variable with impaired word list learning/recall relative to preserved story learning/recall and mild visual memory impairment.  Overall, the test profile raises question of possible vascular cognitive impairment and is less characteristic of a neurodegenerative disorder like Alzheimer's disease.  He has multiple vascular risk factors and underlying medical etiology.  Possible mild depressive disorder manifested in irritability with chronic insomnia, chronic pain issues, physical limitations.   "    History of Presenting Illness:   The following history and presenting issue(s) was obtained through review of EMR notes detailing medical and/or neurological evaluation to date, medical history, diagnostic studies and clinical interview with patient. Patient is an 83 year old, right handed, ,  gentleman who presented for neuropsychological testing upon referral by his PCP for the the evaluation of cognitive changes.  There were limited notes in EMR regarding reason for referral and patient and his spouse did not know why the reason for the visit.  There was note of a MMSE=17/30 and this likely triggered referral. The following is a summary of history and presenting issues:    Cognitive changes:  patient and spouse were vague historians.  Patient feels he is forgetful but that this \"comes with age\". He recalled in part a MMSE at his PCP visit and reported he was having a bad day and that is why he did poorly on the test. His wife is most concerned about increased anger and irritability.  Patient expressed he is fearful he is going to placed in a nursing home but he could not explain why he was feeling this way.     Mood/Behavioral changes:  per wife increased anger and irritability that is a change from baseline.  She noted he is impatient and quick to be verbally aggressive. No physical aggression. He spends most of his time watching TV, enjoying Westerns.  He has had physical health and mobility issues and arm numbness and back pain he attributes to neck/spine issues and this limits his physical activity.  He used to ride motorcycles and work in the yard. Wife notes he was always physically active and busy working so this is an adjustment. He does not sleep well due to pain.  He acknowledged he gets irritable due to pain, lack of sleep, discouragement.     Functional Changes:  resides with wife in single family home of many years. They have children who provide help as needed.  His wife " has always been responsible for finances, shopping, cooking and continues to do all these activities.  She oversees his medications. He rarely drives now due to visual issues.  He is independent in self care.      Relevant Medical Status & History:   Patient Active Problem List   Diagnosis    Abnormal MRI of head    Acute carpal tunnel syndrome, right    Apnea, sleep    Arm numbness    Coronary artery disease    Back pain, chronic    Bilateral sensorineural hearing loss    Cardiomyopathy, ischemic    Cervical spondylosis with myelopathy    Chronic kidney disease (CKD), stage III (moderate) (CMS/HCC)    Chronic rhinitis    COPD (chronic obstructive pulmonary disease) (CMS/HCC)    Decreased sense of smell    Deviated nasal septum    Diabetes mellitus (CMS/HCC)    Diabetic polyneuropathy associated with type 2 diabetes mellitus (CMS/HCC)    Disorder of intervertebral disc of cervical spine    Dyspnea    SOB (shortness of breath)    Edema    Essential hypertension    Graves' orbitopathy    Hyperlipidemia    Hypothyroidism    Insomnia    Left cervical radiculopathy    Memory loss    Nasal congestion    Nasal obstruction    Obesity due to excess calories    Organic impotence    Paresthesia of both hands    Renal insufficiency    Suspected sleep apnea    Tingling    Vitamin D deficiency    Type 2 diabetes mellitus with diabetic chronic kidney disease (CMS/HCC)    Sensorineural hearing loss    Presence of coronary angioplasty implant and graft    Personal history of nicotine dependence    Cervical spondylosis with radiculopathy    Athscl heart disease of native coronary artery w/o ang pctrs    Bell's palsy    Stage 3 chronic kidney disease (CMS/HCC)    BMI 38.0-38.9,adult    Chronic cervical radiculopathy    Old myocardial infarction    Obesity    Long term (current) use of oral hypoglycemic drugs    Ischemic cardiomyopathy    Hypertensive chronic kidney disease w stg 1-4/unsp chr kdny    HLD (hyperlipidemia)    Graves' eye  "disease     Neuroimaging:  MRI Brain 2021 chronic small vessel ischemia     Psychiatric Status & History:  denied psychiatric history     Substance Use History: reports former smoker. He reports that he does not currently use alcohol. He reports that he does not use drugs.    Developmental/Educational/Psychosocial History:  [place] one [place] 5 [place]. [place] Reported he left home as a teenager \"ran away\" to find work.  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  36 years, 6 children.     Test Data Sources:   This neuropsychological evaluation consisted of a review of available medical records, a brief interview with the patient and his spouse, neurobehavioral examination, and the following standardized neuropsychological tests: Folstein MMSE, Dot Counting Test; Reading subtest (WRAT-3); Word List Learning and Memory Test from the Alzheimers Disease Assessment Scale-Cognitive (ADAS-Cog), CERAD version; Will Figures Constructional Praxis Test (copy and recall), [id number]-[id number] version; Digit Span, Story Memory, Story Recall, Figure Copy, Figure Recall, Coding, Line Orientation subtests of the Repeatable Battery for the Assessment of Neuropsychological Status (RBANS); [place] Naming Test; Category Fluency Test (animals); Controlled Oral Word Association Test (F,A,S); Trailmaking Test (Parts A and B) oral version.  Memory, Conceptualization subtest Molly Dementia Rating Scale (DRS); PHQ9 Patient Health Questionnaire, GAD7 Generalized Anxiety Disorder Questionnaire      Cognitive testing was administered and interpretation of results included consideration of appropriate and relevant cultural-linguistic and demographic factors.  Cognitive testing was administered and results of assessment informed the determination of diagnosis or further clarified " etiological factors of cognitive impairment or complaints.    Behavioral Observations/Neurobehavioral Status Exam: accompanied by spouse. Well groomed in casual attire. Ambulated independently with a cane with a slow and antalgic gait.  Oriented to person and place, disoriented to time for year, month, date but gave correct season, day of week. Vague current events. Speech was fluent and articulate. Vague historian at times. Reported longstanding impaired vision due to a chemical burn in his eyes at work  Engaged with testing, good effort. Testing was limited by visual impairment and oral versions of tests were given when possible.       Results/Data:     Testing Interpretation Guidelines: Tests listed in Tests section were interpreted using normative data for age, education, ethnicity, and gender yielding scaled scores (Mean=10+-3), z scores (Mean=0), or T scores (Mean=50+-10).  A z score of  -1.5, a scaled score of less than 7, and/or a T score of less than 35 is suggestive of impairment.   Findings are based on the interpretation of test scores, clinical interview, and behavioral observations.  The following section provides a summary of normal/abnormal findings by cognitive domain.  A detailed list of test scores is not part of this note.     Test Results by Cognitive Domain     Orientation and mental status:    MMSE=20/30.  Disoriented to time for year, date, month.  Unable to do serial subtractions, -3 WORLD backwards, 1/3 delayed recall, unable to copy overlapping rectangles (visual confound)      Language:  Abnormal  mild impairment  Impaired confrontation naming (11/15, -5SD) with circumlocution errors on higher difficulty items.  Did have adequate visual perception of drawings.  Borderline impairment on phonemic/letter fluency.  Low average semantic/category fluency.  Receptive language was intact as evidenced by the ability to follow test instructions and complete simple two- and three-stage commands.      Verbal/auditory Memory:  Abnormal   mild impairment  On tests of verbal learning and memory, performance was variable.  Borderline word list learning curve (3,5,5/10) with moderately impaired delayed recall (3/10 words) with preserved recognition memory.  On a second type of memory task involving the learning and recall of verbal material presented in a story format, performance was average for both immediate and delayed story recall.     Visual Memory:  Abnormal     mild impairment  Borderline immediate recall of 4 geometric shapes drawn several minutes earlier with 2/4 recalled and 4/4 recognized.      Attention/Executive Functioning:  Abnormal   mild impairment  On a test of attention and working memory requiring the repetition of strings of numbers forwards and backwards, performance was average. Aspects of executive functioning involving complex attention, mental flexibility, and speeded information processing, were  variable.  Simple sustained attention was preserved.  Complex information processing was mildly impaired with difficulty sustaining an alternating number letter sequencing task. Aspects of frontal/executive functioning involving verbal reasoning and abstraction were  X for ability to explain how 2 things were alike or related were low average.     Visuospatial/Visuoconstruction:  Abnormal     mild impairment  Visual impairment confound on copies of geometric figures and limited ability to assess other aspects of visuospatial functioning.      Assessment of Mood: Abnormal  Clinical interview and self-report depression screen was within positive range with PHQ9=10/27 moderate depression range.   Chronic insomnia with chronic back pain, arm numbness. He attributes irritability to poor sleep. Loss of usual activities due to physical limitations. Wife notes increased irritability and anger issues. No suicidal ideation.

## 2023-11-29 ENCOUNTER — OFFICE VISIT (OUTPATIENT)
Dept: PRIMARY CARE | Facility: CLINIC | Age: 83
End: 2023-11-29
Payer: MEDICARE

## 2023-11-29 VITALS
HEART RATE: 73 BPM | WEIGHT: 222 LBS | BODY MASS INDEX: 36.94 KG/M2 | DIASTOLIC BLOOD PRESSURE: 67 MMHG | SYSTOLIC BLOOD PRESSURE: 110 MMHG

## 2023-11-29 DIAGNOSIS — I10 ESSENTIAL HYPERTENSION: ICD-10-CM

## 2023-11-29 DIAGNOSIS — N40.1 BENIGN PROSTATIC HYPERPLASIA WITH INCOMPLETE BLADDER EMPTYING: Primary | ICD-10-CM

## 2023-11-29 DIAGNOSIS — E78.2 MIXED HYPERLIPIDEMIA: ICD-10-CM

## 2023-11-29 DIAGNOSIS — R39.14 BENIGN PROSTATIC HYPERPLASIA WITH INCOMPLETE BLADDER EMPTYING: Primary | ICD-10-CM

## 2023-11-29 DIAGNOSIS — E11.9 TYPE 2 DIABETES MELLITUS WITHOUT COMPLICATION, WITHOUT LONG-TERM CURRENT USE OF INSULIN (MULTI): ICD-10-CM

## 2023-11-29 DIAGNOSIS — E03.9 HYPOTHYROIDISM, UNSPECIFIED TYPE: ICD-10-CM

## 2023-11-29 PROCEDURE — 99214 OFFICE O/P EST MOD 30 MIN: CPT | Performed by: INTERNAL MEDICINE

## 2023-11-29 PROCEDURE — 1159F MED LIST DOCD IN RCRD: CPT | Performed by: INTERNAL MEDICINE

## 2023-11-29 PROCEDURE — 1160F RVW MEDS BY RX/DR IN RCRD: CPT | Performed by: INTERNAL MEDICINE

## 2023-11-29 PROCEDURE — 3074F SYST BP LT 130 MM HG: CPT | Performed by: INTERNAL MEDICINE

## 2023-11-29 PROCEDURE — 3078F DIAST BP <80 MM HG: CPT | Performed by: INTERNAL MEDICINE

## 2023-11-29 PROCEDURE — 1036F TOBACCO NON-USER: CPT | Performed by: INTERNAL MEDICINE

## 2023-11-29 RX ORDER — TAMSULOSIN HYDROCHLORIDE 0.4 MG/1
0.4 CAPSULE ORAL DAILY
Qty: 30 CAPSULE | Refills: 11 | Status: SHIPPED | OUTPATIENT
Start: 2023-11-29 | End: 2024-11-28

## 2023-11-29 NOTE — PROGRESS NOTES
"Chief Complaint:   Chief Complaint   Patient presents with    Follow-up      HPI    Baldev Osorio is a 83 y.o. year old male who presents to the clinic for a follow up visit. He states that he is overall doing well. He has been dealing with only one issue which is the frequent urination at night. He states that he has to get up about 2-3 times per night which is affecting his sleep. He was hoping to get something to help with these symptoms.    Denies chest pain/pressure, abdominal pain, nausea, vomiting, fever, chills, headaches.      Objective   Vitals:    11/29/23 1424   BP: 110/67   Pulse: 73        BMI Readings from Last 15 Encounters:   11/29/23 36.94 kg/m²   10/17/23 37.61 kg/m²   09/27/23 37.94 kg/m²   07/24/23 37.44 kg/m²   05/31/23 37.44 kg/m²   05/09/23 38.61 kg/m²   02/22/23 38.65 kg/m²   02/15/23 38.11 kg/m²   11/15/22 39.44 kg/m²   10/17/22 38.44 kg/m²   08/29/22 37.77 kg/m²   05/20/22 37.44 kg/m²   05/10/22 37.94 kg/m²   01/25/22 36.61 kg/m²   11/29/21 37.44 kg/m²        Physical Exam  Physical Exam  Cardiovascular:      Rate and Rhythm: Normal rate and regular rhythm.      Pulses: Normal pulses.      Heart sounds: Normal heart sounds.   Pulmonary:      Effort: Pulmonary effort is normal.      Breath sounds: Normal breath sounds.   Abdominal:      General: Abdomen is flat. Bowel sounds are normal.      Palpations: Abdomen is soft.   Neurological:      Mental Status: He is alert.        Labs:  Lab Results   Component Value Date    WBC 10.1 11/15/2022    HGB 13.8 11/15/2022    HCT 43.8 11/15/2022    MCV 88 11/15/2022     11/15/2022     Lab Results   Component Value Date    GLUCOSE 107 (H) 10/17/2023    CALCIUM 9.6 10/17/2023     10/17/2023    K 4.6 10/17/2023    CO2 29 10/17/2023     10/17/2023    BUN 18 10/17/2023    CREATININE 1.32 (H) 10/17/2023         No results found for: \"ALBUR\", \"NUS69YMS\"  Lab Results   Component Value Date    HGBA1C 6.4 (H) 10/17/2023      Lab Results "   Component Value Date    HGBA1C 6.4 (H) 10/17/2023    HGBA1C 7.1 (A) 05/31/2023    HGBA1C 6.8 (A) 08/29/2022     Lab Results   Component Value Date    TSH 4.04 (H) 10/17/2023     Immunizations:  Immunization History   Administered Date(s) Administered    Influenza, Unspecified 12/04/2009    Pfizer Purple Cap SARS-CoV-2 08/26/2021, 09/16/2021    Pneumococcal polysaccharide vaccine, 23-valent, age 2 years and older (PNEUMOVAX 23) 10/22/2008       Current Medications:  Current Outpatient Medications   Medication Sig Dispense Refill    albuterol 2.5 mg /3 mL (0.083 %) nebulizer solution Take 3 mL (2.5 mg) by nebulization every 8 hours if needed for wheezing or shortness of breath. 75 mL 1    albuterol 90 mcg/actuation inhaler Inhale 2 puffs. EVERY 4 TO 6 HOURS AS NEEDED      aspirin 81 mg chewable tablet Chew 1 tablet (81 mg) once daily.      atorvastatin (Lipitor) 80 mg tablet Take 1 tablet (80 mg) by mouth once daily. 90 tablet 1    cholecalciferol (Vitamin D3) 25 MCG (1000 UT) tablet Take 1 tablet (1,000 Units) by mouth once daily.      DULoxetine (Cymbalta) 30 mg DR capsule Take 1 capsule (30 mg) by mouth once daily. Do not crush or chew.      ezetimibe (Zetia) 10 mg tablet Take 1 tablet (10 mg) by mouth once daily. 90 tablet 1    fexofenadine (Allegra) 180 mg tablet Take 1 tablet (180 mg) by mouth once daily.      furosemide (Lasix) 20 mg tablet Take 1 tablet (20 mg) by mouth once daily as needed (Weight gain or leg swelling). 90 tablet 1    gabapentin (Neurontin) 300 mg capsule Take 1 capsule (300 mg) by mouth 2 times a day. 90 capsule 1    levothyroxine (Synthroid, Levoxyl) 75 mcg tablet Take 1 tablet (75 mcg) by mouth once daily. 90 tablet 3    lisinopril 20 mg tablet Take 1 tablet (20 mg) by mouth once daily. 90 tablet 1    metFORMIN  mg 24 hr tablet Take 1 tablet (500 mg) by mouth once daily in the evening. Take with meals. 90 tablet 1    metoprolol succinate XL (Toprol-XL) 50 mg 24 hr tablet Take 1  tablet (50 mg) by mouth once daily. 90 tablet 1    OneTouch Delica Plus Lancet 30 gauge misc 1 each once daily. 100 each 3    OneTouch Ultra Test strip 1 strip once daily. 100 strip 3    OneTouch Ultra2 Meter kit 1 each once daily. 1 each 0    sildenafil (Viagra) 50 mg tablet Take 0.5 tablets (25 mg) by mouth once daily as needed for erectile dysfunction. 12 tablet 3    tamsulosin (Flomax) 0.4 mg 24 hr capsule Take 1 capsule (0.4 mg) by mouth once daily. 30 capsule 11     No current facility-administered medications for this visit.       Assessment and Plan  Baldev was seen today for follow-up.  Diagnoses and all orders for this visit:  Benign prostatic hyperplasia with incomplete bladder emptying (Primary)  -     tamsulosin (Flomax) 0.4 mg 24 hr capsule; Take 1 capsule (0.4 mg) by mouth once daily.     # HTN  - Current Regimen: Lisinopril 20 mg QD, Metoprolol Succinate ER 50 mg every day  - Continue to monitor BP, daily BP's      # CKD Stage 3a, A1 (Stable)  - Last Cr 1.32, GFR 54 (10/2023)  - Urine Albumin (5/203): 130  - Outpatient Nephrologist: Dr. Díaz     # HFmrEF  - ECHO (6/2023): EF 50-55%, impaired relaxation of LV diastolic filling, increase in calculated LVEF from previous.  - Current Diuretic Regimen: Lasix 20 mg PRN due to soft BP's  - Beta Blocker: Metoprolol Succinate ER 50 mg QD   - ACE/ARB: Lisinopril 20 mg QD   - Outpatient Cardiologist: Dr. Bhupinder Pruitt       # T2DM  - HbA1c (10/2023): 6.4%   - Current Regimen: Metformin HCl  mg QD   [ ] HbA1c next visit     # Left Arm Numbness/Tingling/Pain  # Elevated PHQ-9 Score   - Has been ongoing for years  - Cervical Laminectomy in January of 2021, pain has persisted  - Currently on Gabapentin 300 mg BID     # HLD  - Last Lipid Panel (8/2022): Cholesterol 111, LDL 49, TG's 77  - Continue Atorvastatin 80 mg QD, Ezetimibe 10 mg QD     # Hypothyroidism  # Elevated TSH 2/2 Subclinical Hypothyroidism  - TSH (10/2023): 4.0  - Continue Levothyroxine  75 mcg every day     # Depression  - Continue Duloxetine 30 mg QD     # Urinary Frequency  2/2 BPH  [ ] Start Flomax 0.4 mg every day      Labs:  - CBC, CMP, TSH w/ T4, Lipid Panel, Vitamin D, HbA1c (Yearly Labs 11/2024)  - CMP, Urine albumin 5/2024  - HbA1c 2/2024     Routine Screening:  - Colonoscopy: Last done 6/2017, one 1 mm polyp in the cecum removed, one 5 mm polyp in the sigmoid colon removed, otherwise normal. Repeat in 5-10 years.    Immunizations:   - Pneumococcal: PPSV 2008  - Shingles: none on file, recommended to get this done at pharmacy  - TDAP: none on file  - Influenza: none on file      Please follow up in 3 months.

## 2023-11-29 NOTE — PROGRESS NOTES
I saw and evaluated the patient. I personally obtained the key and critical portions of the history and physical exam or was physically present for key and critical portions performed by the resident/fellow. I reviewed the resident/fellow's documentation and discussed the patient with the resident/fellow. I agree with the resident/fellow's medical decision making as documented in the note.    Nataliia Richardson MD

## 2024-01-01 ENCOUNTER — HOSPITAL ENCOUNTER (EMERGENCY)
Facility: HOSPITAL | Age: 84
End: 2024-11-23
Attending: EMERGENCY MEDICINE
Payer: MEDICARE

## 2024-01-01 ENCOUNTER — HOSPITAL ENCOUNTER (OUTPATIENT)
Dept: CARDIOLOGY | Facility: HOSPITAL | Age: 84
Discharge: HOME | End: 2024-11-22
Payer: MEDICARE

## 2024-01-01 ENCOUNTER — APPOINTMENT (OUTPATIENT)
Dept: RADIOLOGY | Facility: HOSPITAL | Age: 84
End: 2024-01-01
Payer: MEDICARE

## 2024-01-01 ENCOUNTER — LAB (OUTPATIENT)
Dept: LAB | Facility: LAB | Age: 84
End: 2024-01-01
Payer: MEDICARE

## 2024-01-01 VITALS
DIASTOLIC BLOOD PRESSURE: 34 MMHG | OXYGEN SATURATION: 20 % | RESPIRATION RATE: 83 BRPM | SYSTOLIC BLOOD PRESSURE: 96 MMHG

## 2024-01-01 DIAGNOSIS — R73.9 HYPERGLYCEMIA: ICD-10-CM

## 2024-01-01 DIAGNOSIS — E55.9 HYPOVITAMINOSIS D: ICD-10-CM

## 2024-01-01 DIAGNOSIS — M47.12 CERVICAL SPONDYLOSIS WITH MYELOPATHY: ICD-10-CM

## 2024-01-01 DIAGNOSIS — E03.9 HYPOTHYROIDISM, UNSPECIFIED TYPE: ICD-10-CM

## 2024-01-01 DIAGNOSIS — R73.09 ELEVATED GLUCOSE: ICD-10-CM

## 2024-01-01 DIAGNOSIS — N52.1 ERECTILE DYSFUNCTION DUE TO DISEASES CLASSIFIED ELSEWHERE: ICD-10-CM

## 2024-01-01 DIAGNOSIS — I21.3 ST ELEVATION MYOCARDIAL INFARCTION (STEMI), UNSPECIFIED ARTERY (MULTI): Primary | ICD-10-CM

## 2024-01-01 DIAGNOSIS — I46.9 CARDIOPULMONARY ARREST (MULTI): ICD-10-CM

## 2024-01-01 DIAGNOSIS — I21.3 STEMI (ST ELEVATION MYOCARDIAL INFARCTION) (MULTI): ICD-10-CM

## 2024-01-01 DIAGNOSIS — I50.20 HFREF (HEART FAILURE WITH REDUCED EJECTION FRACTION): ICD-10-CM

## 2024-01-01 DIAGNOSIS — E78.2 MIXED HYPERLIPIDEMIA: ICD-10-CM

## 2024-01-01 LAB
25(OH)D3 SERPL-MCNC: 59 NG/ML (ref 30–100)
ALBUMIN SERPL BCP-MCNC: 3.9 G/DL (ref 3.4–5)
ALP SERPL-CCNC: 89 U/L (ref 33–136)
ALT SERPL W P-5'-P-CCNC: 8 U/L (ref 10–52)
ANION GAP BLDV CALCULATED.4IONS-SCNC: 18 MMOL/L (ref 10–25)
ANION GAP SERPL CALC-SCNC: 10 MMOL/L (ref 10–20)
AST SERPL W P-5'-P-CCNC: 16 U/L (ref 9–39)
BASE EXCESS BLDV CALC-SCNC: -8.7 MMOL/L (ref -2–3)
BILIRUB SERPL-MCNC: 0.5 MG/DL (ref 0–1.2)
BNP SERPL-MCNC: 32 PG/ML (ref 0–99)
BODY TEMPERATURE: 37 DEGREES CELSIUS
BUN SERPL-MCNC: 20 MG/DL (ref 6–23)
CA-I BLDV-SCNC: 1.16 MMOL/L (ref 1.1–1.33)
CALCIUM SERPL-MCNC: 9.9 MG/DL (ref 8.6–10.6)
CARDIAC TROPONIN I PNL SERPL HS: 9 NG/L (ref 0–20)
CHLORIDE BLDV-SCNC: 101 MMOL/L (ref 98–107)
CHLORIDE SERPL-SCNC: 103 MMOL/L (ref 98–107)
CHOLEST SERPL-MCNC: 125 MG/DL (ref 0–199)
CHOLESTEROL/HDL RATIO: 2.6
CO2 SERPL-SCNC: 33 MMOL/L (ref 21–32)
CREAT SERPL-MCNC: 1.26 MG/DL (ref 0.5–1.3)
EGFRCR SERPLBLD CKD-EPI 2021: 57 ML/MIN/1.73M*2
ERYTHROCYTE [DISTWIDTH] IN BLOOD BY AUTOMATED COUNT: 14.6 % (ref 11.5–14.5)
EST. AVERAGE GLUCOSE BLD GHB EST-MCNC: 131 MG/DL
FOLATE SERPL-MCNC: 7.8 NG/ML
GLUCOSE BLDV-MCNC: 354 MG/DL (ref 74–99)
GLUCOSE SERPL-MCNC: 88 MG/DL (ref 74–99)
HBA1C MFR BLD: 6.2 %
HCO3 BLDV-SCNC: 21.8 MMOL/L (ref 22–26)
HCT VFR BLD AUTO: 40.3 % (ref 41–52)
HCT VFR BLD EST: 33 % (ref 41–52)
HDLC SERPL-MCNC: 48.7 MG/DL
HGB BLD-MCNC: 13.3 G/DL (ref 13.5–17.5)
HGB BLDV-MCNC: 11.1 G/DL (ref 13.5–17.5)
INHALED O2 CONCENTRATION: 100 %
LACTATE BLDV-SCNC: 9.8 MMOL/L (ref 0.4–2)
LDLC SERPL DIRECT ASSAY-MCNC: 43 MG/DL (ref 0–129)
MCH RBC QN AUTO: 27.9 PG (ref 26–34)
MCHC RBC AUTO-ENTMCNC: 33 G/DL (ref 32–36)
MCV RBC AUTO: 85 FL (ref 80–100)
NON-HDL CHOLESTEROL: 76 MG/DL (ref 0–149)
NRBC BLD-RTO: 0 /100 WBCS (ref 0–0)
OXYHGB MFR BLDV: 16.2 % (ref 45–75)
PCO2 BLDV: 72 MM HG (ref 41–51)
PH BLDV: 7.09 PH (ref 7.33–7.43)
PLATELET # BLD AUTO: 236 X10*3/UL (ref 150–450)
PO2 BLDV: 26 MM HG (ref 35–45)
POTASSIUM BLDV-SCNC: 3.8 MMOL/L (ref 3.5–5.3)
POTASSIUM SERPL-SCNC: 4.2 MMOL/L (ref 3.5–5.3)
PROT SERPL-MCNC: 6.8 G/DL (ref 6.4–8.2)
RBC # BLD AUTO: 4.76 X10*6/UL (ref 4.5–5.9)
SAO2 % BLDV: 16 % (ref 45–75)
SODIUM BLDV-SCNC: 137 MMOL/L (ref 136–145)
SODIUM SERPL-SCNC: 142 MMOL/L (ref 136–145)
TSH SERPL-ACNC: 0.53 MIU/L (ref 0.44–3.98)
VIT B12 SERPL-MCNC: 359 PG/ML (ref 211–911)
WBC # BLD AUTO: 9.3 X10*3/UL (ref 4.4–11.3)

## 2024-01-01 PROCEDURE — 82465 ASSAY BLD/SERUM CHOLESTEROL: CPT

## 2024-01-01 PROCEDURE — 84443 ASSAY THYROID STIM HORMONE: CPT

## 2024-01-01 PROCEDURE — 82746 ASSAY OF FOLIC ACID SERUM: CPT

## 2024-01-01 PROCEDURE — 99292 CRITICAL CARE ADDL 30 MIN: CPT | Performed by: EMERGENCY MEDICINE

## 2024-01-01 PROCEDURE — 83880 ASSAY OF NATRIURETIC PEPTIDE: CPT | Performed by: EMERGENCY MEDICINE

## 2024-01-01 PROCEDURE — 85027 COMPLETE CBC AUTOMATED: CPT

## 2024-01-01 PROCEDURE — 84484 ASSAY OF TROPONIN QUANT: CPT | Performed by: EMERGENCY MEDICINE

## 2024-01-01 PROCEDURE — 84132 ASSAY OF SERUM POTASSIUM: CPT | Performed by: EMERGENCY MEDICINE

## 2024-01-01 PROCEDURE — 36415 COLL VENOUS BLD VENIPUNCTURE: CPT

## 2024-01-01 PROCEDURE — 93005 ELECTROCARDIOGRAM TRACING: CPT

## 2024-01-01 PROCEDURE — 82306 VITAMIN D 25 HYDROXY: CPT

## 2024-01-01 PROCEDURE — 2500000004 HC RX 250 GENERAL PHARMACY W/ HCPCS (ALT 636 FOR OP/ED)

## 2024-01-01 PROCEDURE — 36415 COLL VENOUS BLD VENIPUNCTURE: CPT | Performed by: EMERGENCY MEDICINE

## 2024-01-01 PROCEDURE — 84402 ASSAY OF FREE TESTOSTERONE: CPT

## 2024-01-01 PROCEDURE — 2500000005 HC RX 250 GENERAL PHARMACY W/O HCPCS: Performed by: EMERGENCY MEDICINE

## 2024-01-01 PROCEDURE — 83036 HEMOGLOBIN GLYCOSYLATED A1C: CPT

## 2024-01-01 PROCEDURE — 82435 ASSAY OF BLOOD CHLORIDE: CPT | Performed by: EMERGENCY MEDICINE

## 2024-01-01 PROCEDURE — 82607 VITAMIN B-12: CPT

## 2024-01-01 PROCEDURE — 80053 COMPREHEN METABOLIC PANEL: CPT

## 2024-01-01 PROCEDURE — 92950 HEART/LUNG RESUSCITATION CPR: CPT

## 2024-01-01 PROCEDURE — 2500000004 HC RX 250 GENERAL PHARMACY W/ HCPCS (ALT 636 FOR OP/ED): Performed by: EMERGENCY MEDICINE

## 2024-01-01 PROCEDURE — 83718 ASSAY OF LIPOPROTEIN: CPT

## 2024-01-01 PROCEDURE — 31500 INSERT EMERGENCY AIRWAY: CPT | Performed by: EMERGENCY MEDICINE

## 2024-01-01 PROCEDURE — 83721 ASSAY OF BLOOD LIPOPROTEIN: CPT

## 2024-01-01 PROCEDURE — 99291 CRITICAL CARE FIRST HOUR: CPT | Mod: 25 | Performed by: EMERGENCY MEDICINE

## 2024-01-01 RX ORDER — ETOMIDATE 2 MG/ML
INJECTION INTRAVENOUS CODE/TRAUMA/SEDATION MEDICATION
Status: COMPLETED | OUTPATIENT
Start: 2024-01-01 | End: 2024-01-01

## 2024-01-01 RX ORDER — INDOMETHACIN 25 MG/1
CAPSULE ORAL CODE/TRAUMA/SEDATION MEDICATION
Status: COMPLETED | OUTPATIENT
Start: 2024-01-01 | End: 2024-01-01

## 2024-01-01 RX ORDER — NOREPINEPHRINE BITARTRATE/D5W 8 MG/250ML
PLASTIC BAG, INJECTION (ML) INTRAVENOUS
Status: COMPLETED | OUTPATIENT
Start: 2024-01-01 | End: 2024-01-01

## 2024-01-01 RX ORDER — ROCURONIUM BROMIDE 10 MG/ML
INJECTION, SOLUTION INTRAVENOUS CODE/TRAUMA/SEDATION MEDICATION
Status: COMPLETED | OUTPATIENT
Start: 2024-01-01 | End: 2024-01-01

## 2024-01-01 RX ORDER — PHENYLEPHRINE HCL IN 0.9% NACL 1 MG/10 ML
SYRINGE (ML) INTRAVENOUS
Status: DISCONTINUED
Start: 2024-01-01 | End: 2024-11-23 | Stop reason: HOSPADM

## 2024-01-01 RX ORDER — NOREPINEPHRINE BITARTRATE/D5W 8 MG/250ML
PLASTIC BAG, INJECTION (ML) INTRAVENOUS
Status: DISCONTINUED
Start: 2024-01-01 | End: 2024-11-23 | Stop reason: HOSPADM

## 2024-01-01 RX ORDER — EPINEPHRINE 0.1 MG/ML
INJECTION INTRACARDIAC; INTRAVENOUS CODE/TRAUMA/SEDATION MEDICATION
Status: COMPLETED | OUTPATIENT
Start: 2024-01-01 | End: 2024-01-01

## 2024-01-01 RX ORDER — EPINEPHRINE HCL IN DEXTROSE 5% 4MG/250ML
PLASTIC BAG, INJECTION (ML) INTRAVENOUS
Status: COMPLETED
Start: 2024-01-01 | End: 2024-01-01

## 2024-01-01 RX ORDER — NALOXONE HYDROCHLORIDE 1 MG/ML
INJECTION INTRAMUSCULAR; INTRAVENOUS; SUBCUTANEOUS
Status: DISCONTINUED
Start: 2024-01-01 | End: 2024-11-23 | Stop reason: HOSPADM

## 2024-01-01 RX ORDER — EPINEPHRINE 0.1 MG/ML
INJECTION INTRACARDIAC; INTRAVENOUS
Status: DISCONTINUED
Start: 2024-01-01 | End: 2024-11-23 | Stop reason: HOSPADM

## 2024-01-01 RX ADMIN — EPINEPHRINE 1 MG: 0.1 INJECTION, SOLUTION ENDOTRACHEAL; INTRACARDIAC; INTRAVENOUS at 21:46

## 2024-01-01 RX ADMIN — EPINEPHRINE 1 MG: 0.1 INJECTION, SOLUTION ENDOTRACHEAL; INTRACARDIAC; INTRAVENOUS at 20:48

## 2024-01-01 RX ADMIN — Medication 0.6 MCG/KG/MIN: at 21:19

## 2024-01-01 RX ADMIN — EPINEPHRINE 1 MG: 0.1 INJECTION, SOLUTION ENDOTRACHEAL; INTRACARDIAC; INTRAVENOUS at 20:32

## 2024-01-01 RX ADMIN — EPINEPHRINE 1 MG: 0.1 INJECTION, SOLUTION ENDOTRACHEAL; INTRACARDIAC; INTRAVENOUS at 20:37

## 2024-01-01 RX ADMIN — EPINEPHRINE 1 MG: 0.1 INJECTION, SOLUTION ENDOTRACHEAL; INTRACARDIAC; INTRAVENOUS at 21:02

## 2024-01-01 RX ADMIN — SODIUM BICARBONATE 50 MEQ: 84 INJECTION, SOLUTION INTRAVENOUS at 20:42

## 2024-01-01 RX ADMIN — Medication 0.05 MCG/KG/MIN: at 20:57

## 2024-01-01 RX ADMIN — EPINEPHRINE 1 MG: 0.1 INJECTION, SOLUTION ENDOTRACHEAL; INTRACARDIAC; INTRAVENOUS at 20:45

## 2024-01-01 RX ADMIN — EPINEPHRINE 1 MG: 0.1 INJECTION, SOLUTION ENDOTRACHEAL; INTRACARDIAC; INTRAVENOUS at 20:41

## 2024-01-01 RX ADMIN — VASOPRESSIN 0.03 UNITS/HR: 0.2 INJECTION INTRAVENOUS at 21:16

## 2024-01-01 RX ADMIN — EPINEPHRINE 1 MG: 0.1 INJECTION, SOLUTION ENDOTRACHEAL; INTRACARDIAC; INTRAVENOUS at 20:35

## 2024-01-01 RX ADMIN — ETOMIDATE 20 MG: 2 INJECTION, SOLUTION INTRAVENOUS at 20:20

## 2024-01-01 RX ADMIN — EPINEPHRINE 1 MG: 0.1 INJECTION, SOLUTION ENDOTRACHEAL; INTRACARDIAC; INTRAVENOUS at 21:43

## 2024-01-01 RX ADMIN — ROCURONIUM BROMIDE 100 MG: 10 INJECTION, SOLUTION INTRAVENOUS at 20:21

## 2024-01-01 RX ADMIN — EPINEPHRINE 1 MG: 0.1 INJECTION, SOLUTION ENDOTRACHEAL; INTRACARDIAC; INTRAVENOUS at 21:05

## 2024-02-13 ENCOUNTER — OFFICE VISIT (OUTPATIENT)
Dept: CARDIOLOGY | Facility: HOSPITAL | Age: 84
End: 2024-02-13
Payer: MEDICARE

## 2024-02-13 VITALS
DIASTOLIC BLOOD PRESSURE: 70 MMHG | WEIGHT: 218 LBS | BODY MASS INDEX: 36.32 KG/M2 | SYSTOLIC BLOOD PRESSURE: 125 MMHG | HEART RATE: 78 BPM | HEIGHT: 65 IN

## 2024-02-13 DIAGNOSIS — I25.10 CORONARY ARTERY DISEASE, UNSPECIFIED VESSEL OR LESION TYPE, UNSPECIFIED WHETHER ANGINA PRESENT, UNSPECIFIED WHETHER NATIVE OR TRANSPLANTED HEART: Primary | ICD-10-CM

## 2024-02-13 LAB
ATRIAL RATE: 78 BPM
P AXIS: 74 DEGREES
P OFFSET: 188 MS
P ONSET: 135 MS
PR INTERVAL: 156 MS
Q ONSET: 213 MS
QRS COUNT: 13 BEATS
QRS DURATION: 80 MS
QT INTERVAL: 356 MS
QTC CALCULATION(BAZETT): 405 MS
QTC FREDERICIA: 388 MS
R AXIS: 26 DEGREES
T AXIS: 60 DEGREES
T OFFSET: 391 MS
VENTRICULAR RATE: 78 BPM

## 2024-02-13 PROCEDURE — 99214 OFFICE O/P EST MOD 30 MIN: CPT | Performed by: INTERNAL MEDICINE

## 2024-02-13 PROCEDURE — 93005 ELECTROCARDIOGRAM TRACING: CPT | Performed by: INTERNAL MEDICINE

## 2024-02-13 PROCEDURE — 3074F SYST BP LT 130 MM HG: CPT | Performed by: INTERNAL MEDICINE

## 2024-02-13 PROCEDURE — 1159F MED LIST DOCD IN RCRD: CPT | Performed by: INTERNAL MEDICINE

## 2024-02-13 PROCEDURE — 3078F DIAST BP <80 MM HG: CPT | Performed by: INTERNAL MEDICINE

## 2024-02-13 PROCEDURE — 1036F TOBACCO NON-USER: CPT | Performed by: INTERNAL MEDICINE

## 2024-02-13 PROCEDURE — 1157F ADVNC CARE PLAN IN RCRD: CPT | Performed by: INTERNAL MEDICINE

## 2024-02-13 PROCEDURE — 93010 ELECTROCARDIOGRAM REPORT: CPT | Performed by: INTERNAL MEDICINE

## 2024-02-13 PROCEDURE — 1160F RVW MEDS BY RX/DR IN RCRD: CPT | Performed by: INTERNAL MEDICINE

## 2024-02-13 NOTE — PROGRESS NOTES
Subjective:  Baldev returns for a routine 6-month follow-up.  He generally has been clinically stable from a cardiovascular standpoint.  He is quite limited by some arthritis.  He  also continues struggle with some hand numbness due to some cervical disc disease.  He has not had any hospitalizations and denies any other new health concerns.  His medications remain stable, and he is taking all of these compliantly without side effect.  He unfortunately did not get his sleep study medicine evaluation done as requested.  His wife continues to be concerned as he does snore quite heavily and does suffer with some daytime fatigue.  He overall is reasonably comfortable with how he is doing.  He did not need any prescriptions renewed.    Objective:  General: Alert, usual pleasant overweight elderly male.  HEENT: Unchanged.  Lungs: Generally clear without crackles or wheezing.  Cardiac: Distant heart tones without murmur rub or S3.  Abdomen: Nontender.  Extremities: No edema.  Skin: No rash.  Neuro: Grossly intact and unchanged.    EKG: Sinus rhythm with PVCs.  No acute changes.  Otherwise normal tracing.    Lipid panel: Cholesterol-111, HDL-47, LDL-49, TG-77.    Impression/plan:  Baldev is generally doing well at this time.  He is not having any concerning anginal type symptomatology despite his known coronary disease history.  I thus did not think we needed to embark on any repeat ischemic workup at this time.    His heart rate and blood pressure remain under reasonable control on his current medical regimen so we will continue these unchanged.    His lipid panel also looks quite reasonable on combination therapy.    I will once again put in a new referral for sleep medicine evaluation to see if he has sleep apnea that needs additional therapy.    Patient instructions:    Continue current medications unchanged.    Return to clinic in 6 months.    Report for your sleep medicine evaluation when scheduled.

## 2024-03-15 ENCOUNTER — OFFICE VISIT (OUTPATIENT)
Dept: PRIMARY CARE | Facility: CLINIC | Age: 84
End: 2024-03-15
Payer: MEDICARE

## 2024-03-15 VITALS — DIASTOLIC BLOOD PRESSURE: 70 MMHG | SYSTOLIC BLOOD PRESSURE: 92 MMHG | WEIGHT: 222 LBS | BODY MASS INDEX: 37.52 KG/M2

## 2024-03-15 DIAGNOSIS — E78.2 MIXED HYPERLIPIDEMIA: ICD-10-CM

## 2024-03-15 DIAGNOSIS — N52.1 ERECTILE DYSFUNCTION DUE TO DISEASES CLASSIFIED ELSEWHERE: ICD-10-CM

## 2024-03-15 DIAGNOSIS — I50.20 HFREF (HEART FAILURE WITH REDUCED EJECTION FRACTION) (MULTI): ICD-10-CM

## 2024-03-15 DIAGNOSIS — N18.31 STAGE 3A CHRONIC KIDNEY DISEASE (MULTI): ICD-10-CM

## 2024-03-15 DIAGNOSIS — E66.01 OBESITY, MORBID (MULTI): ICD-10-CM

## 2024-03-15 DIAGNOSIS — E11.22 TYPE 2 DIABETES MELLITUS WITH CHRONIC KIDNEY DISEASE, WITHOUT LONG-TERM CURRENT USE OF INSULIN, UNSPECIFIED CKD STAGE (MULTI): ICD-10-CM

## 2024-03-15 DIAGNOSIS — J44.9 CHRONIC OBSTRUCTIVE PULMONARY DISEASE, UNSPECIFIED COPD TYPE (MULTI): ICD-10-CM

## 2024-03-15 DIAGNOSIS — I10 ESSENTIAL HYPERTENSION: Primary | ICD-10-CM

## 2024-03-15 PROCEDURE — 1159F MED LIST DOCD IN RCRD: CPT | Performed by: INTERNAL MEDICINE

## 2024-03-15 PROCEDURE — 99214 OFFICE O/P EST MOD 30 MIN: CPT | Performed by: INTERNAL MEDICINE

## 2024-03-15 PROCEDURE — G0439 PPPS, SUBSEQ VISIT: HCPCS | Performed by: INTERNAL MEDICINE

## 2024-03-15 PROCEDURE — 1157F ADVNC CARE PLAN IN RCRD: CPT | Performed by: INTERNAL MEDICINE

## 2024-03-15 PROCEDURE — 1170F FXNL STATUS ASSESSED: CPT | Performed by: INTERNAL MEDICINE

## 2024-03-15 PROCEDURE — 1036F TOBACCO NON-USER: CPT | Performed by: INTERNAL MEDICINE

## 2024-03-15 PROCEDURE — 1160F RVW MEDS BY RX/DR IN RCRD: CPT | Performed by: INTERNAL MEDICINE

## 2024-03-15 PROCEDURE — 3078F DIAST BP <80 MM HG: CPT | Performed by: INTERNAL MEDICINE

## 2024-03-15 PROCEDURE — 1124F ACP DISCUSS-NO DSCNMKR DOCD: CPT | Performed by: INTERNAL MEDICINE

## 2024-03-15 PROCEDURE — 3074F SYST BP LT 130 MM HG: CPT | Performed by: INTERNAL MEDICINE

## 2024-03-15 RX ORDER — SILDENAFIL 50 MG/1
25 TABLET, FILM COATED ORAL DAILY PRN
Qty: 12 TABLET | Refills: 3 | Status: SHIPPED | OUTPATIENT
Start: 2024-03-15 | End: 2025-03-15

## 2024-03-15 ASSESSMENT — PATIENT HEALTH QUESTIONNAIRE - PHQ9
SUM OF ALL RESPONSES TO PHQ9 QUESTIONS 1 AND 2: 0
1. LITTLE INTEREST OR PLEASURE IN DOING THINGS: NOT AT ALL
2. FEELING DOWN, DEPRESSED OR HOPELESS: NOT AT ALL
1. LITTLE INTEREST OR PLEASURE IN DOING THINGS: NOT AT ALL
2. FEELING DOWN, DEPRESSED OR HOPELESS: NOT AT ALL
SUM OF ALL RESPONSES TO PHQ9 QUESTIONS 1 AND 2: 0

## 2024-03-15 ASSESSMENT — ACTIVITIES OF DAILY LIVING (ADL)
TAKING_MEDICATION: INDEPENDENT
BATHING: INDEPENDENT
GROCERY_SHOPPING: INDEPENDENT
DRESSING: INDEPENDENT
DOING_HOUSEWORK: INDEPENDENT
MANAGING_FINANCES: INDEPENDENT

## 2024-03-15 ASSESSMENT — ENCOUNTER SYMPTOMS
DEPRESSION: 0
LOSS OF SENSATION IN FEET: 0
OCCASIONAL FEELINGS OF UNSTEADINESS: 0

## 2024-03-15 NOTE — PROGRESS NOTES
I reviewed the resident/fellow's documentation and discussed the patient with the resident/fellow. I agree with the resident/fellow's medical decision making as documented in the note.  As the attending physician, I certify that I personally reviewed the patient's history and personally examined the patient to confirm the physical findings described above, and that I reviewed the relevant imaging studies and available reports. I also discussed the differential diagnosis and all of the proposed management plans with the patient and individuals accompanying the patient to this visit. They had the opportunity to ask questions about the proposed management plans and to have those questions answered.     Nataliia Richardson MD

## 2024-03-15 NOTE — PROGRESS NOTES
Chief Complaint:   Chief Complaint   Patient presents with    Med Refill    Medicare Annual Wellness Visit Subsequent      HPI    Baldev Osorio is a 83 y.o. year old male who presents to the clinic for a medicare wellness visit. He is overall doing well with no new issues.     Denies chest pain/pressure, abdominal pain, nausea, vomiting, fever, chills, headaches.     Medicare Wellness Billing Compliance Satisfied    *This is a visual tool to show completion of required items on the day of the visit. Green checks will only appear on the date of visit.    Review all medications by prescribing practitioner or clinical pharmacist (such as prescriptions, OTCs, herbal therapies and supplements) documented in the medical record    Past Medical, Surgical, and Family History reviewed and updated in chart    Tobacco Use Reviewed    Alcohol Use Reviewed    Illicit Drug Use Reviewed    PHQ2/9    Falls in Last Year Reviewed    Home Safety Risk Factors Reviewed    Cognitive Impairment Reviewed    Patient Self Assessment and Health Status    Current Diet Reviewed    Exercise Frequency    ADL - Hearing Impairment    ADL - Bathing    ADL - Dressing    ADL - Walks in Home    IADL - Managing Finances    IADL - Grocery Shopping    IADL - Taking Medications    IADL - Doing Housework        Past Medical History   Past Medical History:   Diagnosis Date    Body mass index (BMI) 35.0-35.9, adult 03/26/2021    Body mass index (BMI) of 35.0 to 35.9    Body mass index (BMI) 36.0-36.9, adult 10/01/2021    Body mass index (BMI) of 36.0 to 36.9    Body mass index (BMI) 37.0-37.9, adult 08/29/2022    Body mass index (BMI) of 37.0 to 37.9 in adult    Body mass index (BMI) 37.0-37.9, adult 10/09/2020    BMI 37.0-37.9, adult    Body mass index (BMI) 38.0-38.9, adult 01/19/2021    Body mass index (BMI) of 38.0 to 38.9 in adult    Body mass index (BMI) 38.0-38.9, adult 08/14/2020    Body mass index (BMI) of 38.0 to 38.9 in adult     Body mass index (BMI) 39.0-39.9, adult 07/09/2020    Body mass index (BMI) of 39.0 to 39.9 in adult    Body mass index (BMI)40.0-44.9, adult 12/31/2019    Body mass index (BMI) of 40.0 to 44.9 in adult    Essential (primary) hypertension 03/27/2013    Benign essential hypertension    Furuncle of limb, unspecified 01/27/2014    Furuncle of axilla    Hyperlipidemia, unspecified 05/20/2022    Hyperlipidemia    Inflammatory disorders of scrotum 10/25/2017    Scrotal abscess    Morbid (severe) obesity due to excess calories (CMS/HCC) 08/29/2022    Class 2 severe obesity with serious comorbidity and body mass index (BMI) of 37.0 to 37.9 in adult, unspecified obesity type    Morbid (severe) obesity due to excess calories (CMS/HCC) 05/20/2022    Class 2 severe obesity with serious comorbidity and body mass index (BMI) of 36.0 to 36.9 in adult, unspecified obesity type    Obesity, unspecified 10/01/2021    Class 2 obesity in adult    Other conditions influencing health status     Acute Myocardial Infarction    Personal history of diseases of the blood and blood-forming organs and certain disorders involving the immune mechanism 08/02/2017    History of iron deficiency anemia    Personal history of other diseases of the digestive system     History of hiatal hernia    Personal history of other diseases of the nervous system and sense organs     History of glaucoma    Personal history of other endocrine, nutritional and metabolic disease     History of thyroid disease    Personal history of other endocrine, nutritional and metabolic disease 11/15/2022    History of Graves' disease    Personal history of other endocrine, nutritional and metabolic disease 05/11/2015    History of type 2 diabetes mellitus    Rash and other nonspecific skin eruption 01/17/2014    Rash    Solitary pulmonary nodule 11/27/2018    Lung nodule      Past Surgical History:   Past Surgical History:   Procedure Laterality Date    CARDIAC CATHETERIZATION   06/02/2013    Cardiac Cath Procedure Summary    COLONOSCOPY  06/06/2017    Complete Colonoscopy    HERNIA REPAIR  06/02/2013    Hernia Repair    MR HEAD ANGIO WO IV CONTRAST  1/11/2021    MR HEAD ANGIO WO IV CONTRAST 1/11/2021 [facility]    MR NECK ANGIO WO IV CONTRAST  1/11/2021    MR NECK ANGIO WO IV CONTRAST 1/11/2021 [facility]    OTHER SURGICAL HISTORY  06/02/2013    Cardiac Cath Lesion 1, 1st Adjunct Treat Device: Stent    OTHER SURGICAL HISTORY  11/29/2021    Cervical laminectomy    OTHER SURGICAL HISTORY  03/03/2021    Posterior cervical vertebral fusion     Family History:   Family History   Problem Relation Name Age of Onset    Heart disease Mother      Thyroid disease Mother      Colon cancer Father      Kidney failure Father      Thyroid disease Sister      Other (grand mal seizures) Sister      Heart attack Brother      Prostate cancer Other       Social History:   Tobacco Use: Low Risk  (3/15/2024)    Patient History     Smoking Tobacco Use: Never     Smokeless Tobacco Use: Never     Passive Exposure: Not on file      Social History     Substance and Sexual Activity   Alcohol Use Not Currently        Allergies:   Allergies   Allergen Reactions    House Dust Unknown        ROS   Review of Systems     Objective   Vitals:    03/15/24 1441   BP: 92/70      BMI Readings from Last 15 Encounters:   03/15/24 37.52 kg/m²   02/13/24 36.84 kg/m²   11/29/23 36.94 kg/m²   10/17/23 37.61 kg/m²   09/27/23 37.94 kg/m²   09/11/23 37.44 kg/m²   08/15/23 37.28 kg/m²   07/24/23 37.44 kg/m²   05/31/23 37.44 kg/m²   05/09/23 38.61 kg/m²   02/22/23 38.65 kg/m²   02/15/23 38.11 kg/m²   11/15/22 39.44 kg/m²   10/17/22 38.44 kg/m²   08/29/22 37.77 kg/m²      101 kg (222 lb) (3/15/2024  2:41 PM)      Physical Exam  Physical Exam  Constitutional:       Appearance: He is obese.   Cardiovascular:      Rate and Rhythm: Normal rate and regular rhythm.      Pulses: Normal pulses.      Heart sounds: Normal heart  "sounds.   Pulmonary:      Effort: Pulmonary effort is normal.      Breath sounds: Normal breath sounds.   Abdominal:      General: Abdomen is flat. Bowel sounds are normal.      Palpations: Abdomen is soft.   Neurological:      Mental Status: He is alert.          Labs:  Lab Results   Component Value Date    WBC 10.1 11/15/2022    HGB 13.8 11/15/2022    HCT 43.8 11/15/2022    MCV 88 11/15/2022     11/15/2022     Lab Results   Component Value Date    GLUCOSE 107 (H) 10/17/2023    CALCIUM 9.6 10/17/2023     10/17/2023    K 4.6 10/17/2023    CO2 29 10/17/2023     10/17/2023    BUN 18 10/17/2023    CREATININE 1.32 (H) 10/17/2023         No components found for: \"URINE ALBUMIN\"  Lab Results   Component Value Date    HGBA1C 6.4 (H) 10/17/2023      Lab Results   Component Value Date    HGBA1C 6.4 (H) 10/17/2023    HGBA1C 7.1 (A) 05/31/2023    HGBA1C 6.8 (A) 08/29/2022     Lab Results   Component Value Date    TSH 4.04 (H) 10/17/2023       Current Medications:  Current Outpatient Medications   Medication Sig Dispense Refill    albuterol 2.5 mg /3 mL (0.083 %) nebulizer solution Take 3 mL (2.5 mg) by nebulization every 8 hours if needed for wheezing or shortness of breath. 75 mL 1    albuterol 90 mcg/actuation inhaler Inhale 2 puffs. EVERY 4 TO 6 HOURS AS NEEDED      aspirin 81 mg chewable tablet Chew 1 tablet (81 mg) once daily.      atorvastatin (Lipitor) 80 mg tablet Take 1 tablet (80 mg) by mouth once daily. 90 tablet 1    cholecalciferol (Vitamin D3) 25 MCG (1000 UT) tablet Take 1 tablet (1,000 Units) by mouth once daily.      ezetimibe (Zetia) 10 mg tablet Take 1 tablet (10 mg) by mouth once daily. 90 tablet 1    fexofenadine (Allegra) 180 mg tablet Take 1 tablet (180 mg) by mouth once daily.      furosemide (Lasix) 20 mg tablet Take 1 tablet (20 mg) by mouth once daily as needed (Weight gain or leg swelling). 90 tablet 1    gabapentin (Neurontin) 300 mg capsule Take 1 capsule (300 mg) by mouth 2 " times a day. 90 capsule 1    levothyroxine (Synthroid, Levoxyl) 75 mcg tablet Take 1 tablet (75 mcg) by mouth once daily. 90 tablet 3    lisinopril 20 mg tablet Take 1 tablet (20 mg) by mouth once daily. 90 tablet 1    metFORMIN  mg 24 hr tablet Take 1 tablet (500 mg) by mouth once daily in the evening. Take with meals. 90 tablet 1    metoprolol succinate XL (Toprol-XL) 50 mg 24 hr tablet Take 1 tablet (50 mg) by mouth once daily. 90 tablet 1    OneTouch Delica Plus Lancet 30 gauge misc 1 each once daily. 100 each 3    OneTouch Ultra Test strip 1 strip once daily. 100 strip 3    OneTouch Ultra2 Meter kit 1 each once daily. 1 each 0    sildenafil (Viagra) 50 mg tablet Take 0.5 tablets (25 mg) by mouth once daily as needed for erectile dysfunction. 12 tablet 3    tamsulosin (Flomax) 0.4 mg 24 hr capsule Take 1 capsule (0.4 mg) by mouth once daily. 30 capsule 11     No current facility-administered medications for this visit.       Assessment and Plan  There are no diagnoses linked to this encounter.   # HTN  - Current Regimen: Lisinopril 20 mg QD, Metoprolol Succinate ER 50 mg every day  - Continue to monitor BP, daily BP's      # CKD Stage 3a, A1 (Stable)  - Last Cr 1.32, GFR 54 (10/2023)  - Urine Albumin (5/203): 130  - Outpatient Nephrologist: Dr. Díaz     # HFmrEF  - ECHO (6/2023): EF 50-55%, impaired relaxation of LV diastolic filling, increase in calculated LVEF from previous.  - Current Diuretic Regimen: Lasix 20 mg PRN due to soft BP's  - Beta Blocker: Metoprolol Succinate ER 50 mg QD   - ACE/ARB: Lisinopril 20 mg QD   - Outpatient Cardiologist: Dr. Bhupinder Pruitt       # T2DM  - HbA1c (10/2023): 6.4%   - Current Regimen: Metformin HCl  mg QD   [ ] HbA1c next visit     # Left Arm Numbness/Tingling/Pain  # Elevated PHQ-9 Score   - Has been ongoing for years  - Cervical Laminectomy in January of 2021, pain has persisted  - Currently on Gabapentin 300 mg BID     # HLD  - Last Lipid Panel  (8/2022): Cholesterol 111, LDL 49, TG's 77  - Continue Atorvastatin 80 mg QD, Ezetimibe 10 mg QD     # Hypothyroidism  # Elevated TSH 2/2 Subclinical Hypothyroidism  - TSH (10/2023): 4.0  - Continue Levothyroxine 75 mcg every day     # Depression  - Continue Duloxetine 30 mg QD      # Urinary Frequency  2/2 BPH  - c/w Flomax 0.4 mg every day      Labs:  - CBC, CMP, TSH w/ T4, Lipid Panel, Vitamin D, HbA1c (Yearly Labs 11/2024)  - CMP, Urine albumin, Hba1c due, Lipid Panel requisite form provided    Routine Screening:  - Colonoscopy: Last done 6/2017, one 1 mm polyp in the cecum removed, one 5 mm polyp in the sigmoid colon removed, otherwise normal. Repeat in 5-10 years.    Immunizations:  Immunization History   Administered Date(s) Administered    Influenza, Unspecified 12/04/2009    Pfizer Purple Cap SARS-CoV-2 08/26/2021, 09/16/2021    Pneumococcal polysaccharide vaccine, 23-valent, age 2 years and older (PNEUMOVAX 23) 10/22/2008     Please follow up in 3 months.

## 2024-04-10 ENCOUNTER — LAB (OUTPATIENT)
Dept: LAB | Facility: LAB | Age: 84
End: 2024-04-10
Payer: MEDICARE

## 2024-04-10 DIAGNOSIS — E78.2 MIXED HYPERLIPIDEMIA: ICD-10-CM

## 2024-04-10 DIAGNOSIS — I10 ESSENTIAL HYPERTENSION: ICD-10-CM

## 2024-04-10 DIAGNOSIS — E03.8 OTHER SPECIFIED HYPOTHYROIDISM: ICD-10-CM

## 2024-04-10 DIAGNOSIS — E11.22 TYPE 2 DIABETES MELLITUS WITH CHRONIC KIDNEY DISEASE, WITHOUT LONG-TERM CURRENT USE OF INSULIN, UNSPECIFIED CKD STAGE (MULTI): ICD-10-CM

## 2024-04-10 LAB
ALBUMIN SERPL BCP-MCNC: 4.1 G/DL (ref 3.4–5)
ALP SERPL-CCNC: 78 U/L (ref 33–136)
ALT SERPL W P-5'-P-CCNC: 9 U/L (ref 10–52)
ANION GAP SERPL CALC-SCNC: 12 MMOL/L (ref 10–20)
AST SERPL W P-5'-P-CCNC: 12 U/L (ref 9–39)
BILIRUB SERPL-MCNC: 0.4 MG/DL (ref 0–1.2)
BUN SERPL-MCNC: 21 MG/DL (ref 6–23)
CALCIUM SERPL-MCNC: 9.3 MG/DL (ref 8.6–10.3)
CHLORIDE SERPL-SCNC: 100 MMOL/L (ref 98–107)
CHOLEST SERPL-MCNC: 118 MG/DL (ref 0–199)
CHOLESTEROL/HDL RATIO: 2.3
CO2 SERPL-SCNC: 33 MMOL/L (ref 21–32)
CREAT SERPL-MCNC: 1.26 MG/DL (ref 0.5–1.3)
CREAT UR-MCNC: 154.3 MG/DL (ref 20–370)
EGFRCR SERPLBLD CKD-EPI 2021: 57 ML/MIN/1.73M*2
EST. AVERAGE GLUCOSE BLD GHB EST-MCNC: 137 MG/DL
GLUCOSE SERPL-MCNC: 107 MG/DL (ref 74–99)
HBA1C MFR BLD: 6.4 %
HDLC SERPL-MCNC: 51.3 MG/DL
LDLC SERPL CALC-MCNC: 51 MG/DL
MICROALBUMIN UR-MCNC: 16.1 MG/L
MICROALBUMIN/CREAT UR: 10.4 UG/MG CREAT
NON HDL CHOLESTEROL: 67 MG/DL (ref 0–149)
POTASSIUM SERPL-SCNC: 4.5 MMOL/L (ref 3.5–5.3)
PROT SERPL-MCNC: 6.7 G/DL (ref 6.4–8.2)
SODIUM SERPL-SCNC: 140 MMOL/L (ref 136–145)
TRIGL SERPL-MCNC: 79 MG/DL (ref 0–149)
TSH SERPL-ACNC: 0.92 MIU/L (ref 0.44–3.98)
VLDL: 16 MG/DL (ref 0–40)

## 2024-04-10 PROCEDURE — 84443 ASSAY THYROID STIM HORMONE: CPT

## 2024-04-10 PROCEDURE — 82043 UR ALBUMIN QUANTITATIVE: CPT

## 2024-04-10 PROCEDURE — 80061 LIPID PANEL: CPT

## 2024-04-10 PROCEDURE — 83036 HEMOGLOBIN GLYCOSYLATED A1C: CPT

## 2024-04-10 PROCEDURE — 36415 COLL VENOUS BLD VENIPUNCTURE: CPT

## 2024-04-10 PROCEDURE — 82570 ASSAY OF URINE CREATININE: CPT

## 2024-04-10 PROCEDURE — 80053 COMPREHEN METABOLIC PANEL: CPT

## 2024-05-19 ENCOUNTER — HOSPITAL ENCOUNTER (EMERGENCY)
Facility: HOSPITAL | Age: 84
Discharge: HOME | End: 2024-05-19
Attending: EMERGENCY MEDICINE
Payer: MEDICARE

## 2024-05-19 VITALS
SYSTOLIC BLOOD PRESSURE: 124 MMHG | DIASTOLIC BLOOD PRESSURE: 77 MMHG | BODY MASS INDEX: 36.99 KG/M2 | WEIGHT: 222 LBS | HEIGHT: 65 IN | RESPIRATION RATE: 18 BRPM | HEART RATE: 53 BPM | OXYGEN SATURATION: 96 % | TEMPERATURE: 97.4 F

## 2024-05-19 DIAGNOSIS — N39.0 URINARY TRACT INFECTION WITH HEMATURIA, SITE UNSPECIFIED: ICD-10-CM

## 2024-05-19 DIAGNOSIS — R31.9 HEMATURIA, UNSPECIFIED TYPE: Primary | ICD-10-CM

## 2024-05-19 DIAGNOSIS — R31.9 URINARY TRACT INFECTION WITH HEMATURIA, SITE UNSPECIFIED: ICD-10-CM

## 2024-05-19 LAB
ALBUMIN SERPL BCP-MCNC: 3.9 G/DL (ref 3.4–5)
ALP SERPL-CCNC: 77 U/L (ref 33–136)
ALT SERPL W P-5'-P-CCNC: 9 U/L (ref 10–52)
ANION GAP SERPL CALC-SCNC: 13 MMOL/L (ref 10–20)
APPEARANCE UR: CLEAR
AST SERPL W P-5'-P-CCNC: 13 U/L (ref 9–39)
BACTERIA #/AREA URNS AUTO: ABNORMAL /HPF
BASOPHILS # BLD AUTO: 0.05 X10*3/UL (ref 0–0.1)
BASOPHILS NFR BLD AUTO: 0.6 %
BILIRUB SERPL-MCNC: 0.5 MG/DL (ref 0–1.2)
BILIRUB UR STRIP.AUTO-MCNC: NEGATIVE MG/DL
BUN SERPL-MCNC: 27 MG/DL (ref 6–23)
CALCIUM SERPL-MCNC: 9.2 MG/DL (ref 8.6–10.3)
CHLORIDE SERPL-SCNC: 102 MMOL/L (ref 98–107)
CO2 SERPL-SCNC: 31 MMOL/L (ref 21–32)
COLOR UR: ABNORMAL
CREAT SERPL-MCNC: 1.94 MG/DL (ref 0.5–1.3)
EGFRCR SERPLBLD CKD-EPI 2021: 34 ML/MIN/1.73M*2
EOSINOPHIL # BLD AUTO: 0.43 X10*3/UL (ref 0–0.4)
EOSINOPHIL NFR BLD AUTO: 5 %
ERYTHROCYTE [DISTWIDTH] IN BLOOD BY AUTOMATED COUNT: 15.3 % (ref 11.5–14.5)
GLUCOSE SERPL-MCNC: 98 MG/DL (ref 74–99)
GLUCOSE UR STRIP.AUTO-MCNC: NORMAL MG/DL
HCT VFR BLD AUTO: 39.5 % (ref 41–52)
HGB BLD-MCNC: 12.8 G/DL (ref 13.5–17.5)
IMM GRANULOCYTES # BLD AUTO: 0.03 X10*3/UL (ref 0–0.5)
IMM GRANULOCYTES NFR BLD AUTO: 0.3 % (ref 0–0.9)
KETONES UR STRIP.AUTO-MCNC: NEGATIVE MG/DL
LEUKOCYTE ESTERASE UR QL STRIP.AUTO: ABNORMAL
LYMPHOCYTES # BLD AUTO: 1.96 X10*3/UL (ref 0.8–3)
LYMPHOCYTES NFR BLD AUTO: 22.8 %
MCH RBC QN AUTO: 27.5 PG (ref 26–34)
MCHC RBC AUTO-ENTMCNC: 32.4 G/DL (ref 32–36)
MCV RBC AUTO: 85 FL (ref 80–100)
MONOCYTES # BLD AUTO: 0.7 X10*3/UL (ref 0.05–0.8)
MONOCYTES NFR BLD AUTO: 8.2 %
MUCOUS THREADS #/AREA URNS AUTO: ABNORMAL /LPF
NEUTROPHILS # BLD AUTO: 5.41 X10*3/UL (ref 1.6–5.5)
NEUTROPHILS NFR BLD AUTO: 63.1 %
NITRITE UR QL STRIP.AUTO: NEGATIVE
NRBC BLD-RTO: 0 /100 WBCS (ref 0–0)
PH UR STRIP.AUTO: 5 [PH]
PLATELET # BLD AUTO: 209 X10*3/UL (ref 150–450)
POTASSIUM SERPL-SCNC: 4.1 MMOL/L (ref 3.5–5.3)
PROT SERPL-MCNC: 6.9 G/DL (ref 6.4–8.2)
PROT UR STRIP.AUTO-MCNC: NEGATIVE MG/DL
RBC # BLD AUTO: 4.66 X10*6/UL (ref 4.5–5.9)
RBC # UR STRIP.AUTO: NEGATIVE /UL
RBC #/AREA URNS AUTO: ABNORMAL /HPF
SODIUM SERPL-SCNC: 142 MMOL/L (ref 136–145)
SP GR UR STRIP.AUTO: 1.01
UROBILINOGEN UR STRIP.AUTO-MCNC: NORMAL MG/DL
WBC # BLD AUTO: 8.6 X10*3/UL (ref 4.4–11.3)
WBC #/AREA URNS AUTO: ABNORMAL /HPF

## 2024-05-19 PROCEDURE — 99283 EMERGENCY DEPT VISIT LOW MDM: CPT

## 2024-05-19 PROCEDURE — 36415 COLL VENOUS BLD VENIPUNCTURE: CPT | Performed by: PHYSICIAN ASSISTANT

## 2024-05-19 PROCEDURE — 85025 COMPLETE CBC W/AUTO DIFF WBC: CPT | Performed by: EMERGENCY MEDICINE

## 2024-05-19 PROCEDURE — 81001 URINALYSIS AUTO W/SCOPE: CPT | Performed by: EMERGENCY MEDICINE

## 2024-05-19 PROCEDURE — 85025 COMPLETE CBC W/AUTO DIFF WBC: CPT | Performed by: PHYSICIAN ASSISTANT

## 2024-05-19 PROCEDURE — 80053 COMPREHEN METABOLIC PANEL: CPT | Performed by: EMERGENCY MEDICINE

## 2024-05-19 RX ORDER — CEPHALEXIN 500 MG/1
500 CAPSULE ORAL 2 TIMES DAILY
Qty: 14 CAPSULE | Refills: 0 | Status: SHIPPED | OUTPATIENT
Start: 2024-05-19 | End: 2024-05-26

## 2024-05-19 ASSESSMENT — COLUMBIA-SUICIDE SEVERITY RATING SCALE - C-SSRS
2. HAVE YOU ACTUALLY HAD ANY THOUGHTS OF KILLING YOURSELF?: NO
6. HAVE YOU EVER DONE ANYTHING, STARTED TO DO ANYTHING, OR PREPARED TO DO ANYTHING TO END YOUR LIFE?: NO
1. IN THE PAST MONTH, HAVE YOU WISHED YOU WERE DEAD OR WISHED YOU COULD GO TO SLEEP AND NOT WAKE UP?: NO

## 2024-05-19 ASSESSMENT — PAIN SCALES - GENERAL: PAINLEVEL_OUTOF10: 0 - NO PAIN

## 2024-05-19 ASSESSMENT — PAIN - FUNCTIONAL ASSESSMENT: PAIN_FUNCTIONAL_ASSESSMENT: 0-10

## 2024-05-19 NOTE — ED PROVIDER NOTES
CC: Blood in Urine     HPI:  83 year-old male presents to the emergency department with complaints of hematuria starting this morning.  Patient noticed blood in his shorts this morning, passed large blood clot while urinating.  No abdominal pain, dysuria, fevers, chills, chest pain, trouble breathing, nausea, vomiting, or other associated symptoms.  No history of similar, is not currently on anticoagulation.    Records Reviewed:  Recent available ED and inpatient notes reviewed in EMR.    PMHx/PSHx:  Per HPI.   - has Abnormal MRI of head; Acute carpal tunnel syndrome, right; Apnea, sleep; Arm numbness; Coronary artery disease; Back pain, chronic; Bilateral sensorineural hearing loss; Cardiomyopathy, ischemic; Cervical spondylosis with myelopathy; Chronic kidney disease (CKD), stage III (moderate) (Multi); Chronic rhinitis; COPD (chronic obstructive pulmonary disease) (Multi); Decreased sense of smell; Deviated nasal septum; Diabetes mellitus (Multi); Diabetic polyneuropathy associated with type 2 diabetes mellitus (Multi); Disorder of intervertebral disc of cervical spine; Dyspnea; SOB (shortness of breath); Edema; Essential hypertension; Graves' orbitopathy; Hyperlipidemia; Hypothyroidism; Insomnia; Left cervical radiculopathy; Memory loss; Nasal congestion; Nasal obstruction; Obesity due to excess calories; Organic impotence; Paresthesia of both hands; Renal insufficiency; Suspected sleep apnea; Tingling; Vitamin D deficiency; Type 2 diabetes mellitus with diabetic chronic kidney disease (Multi); Sensorineural hearing loss; Presence of coronary angioplasty implant and graft; Personal history of nicotine dependence; Cervical spondylosis with radiculopathy; Athscl heart disease of native coronary artery w/o ang pctrs; Bell's palsy; Stage 3 chronic kidney disease (Multi); BMI 38.0-38.9,adult; Chronic cervical radiculopathy; Old myocardial infarction; Obesity; Long term (current) use of oral hypoglycemic drugs;  Ischemic cardiomyopathy; Hypertensive chronic kidney disease w stg 1-4/unsp chr kdny; HLD (hyperlipidemia); Graves' eye disease; Erectile dysfunction due to diseases classified elsewhere; HFrEF (heart failure with reduced ejection fraction) (Multi); and Obesity, morbid (Multi) on their problem list.  - has a past surgical history that includes Hernia repair (06/02/2013); Other surgical history (06/02/2013); Cardiac catheterization (06/02/2013); Other surgical history (11/29/2021); Other surgical history (03/03/2021); Colonoscopy (06/06/2017); MR angio head wo IV contrast (1/11/2021); and MR angio neck wo IV contrast (1/11/2021).    Medications:  Reviewed in EMR. See EMR for complete list of medications and doses.    Allergies:  House dust    Social History:  - Tobacco:  reports that he has never smoked. He has never used smokeless tobacco.   - Alcohol:  reports that he does not currently use alcohol.   - Illicit Drugs:  reports no history of drug use.     ROS:  Per HPI.       ???????????????????????????????????????????????????????????????  Triage Vitals:  T 36.9 °C (98.4 °F)  HR 62  /79  RR 18  O2 96 % None (Room air)    Physical Exam  Vitals and nursing note reviewed.   Constitutional:       Appearance: Normal appearance.   Cardiovascular:      Rate and Rhythm: Normal rate and regular rhythm.      Heart sounds: Normal heart sounds.   Pulmonary:      Effort: Pulmonary effort is normal.      Breath sounds: Normal breath sounds. No wheezing or rales.   Abdominal:      General: There is no distension.      Palpations: Abdomen is soft.      Tenderness: There is no abdominal tenderness.   Genitourinary:     Penis: Normal.    Skin:     General: Skin is warm and dry.   Neurological:      Mental Status: He is alert.      Comments: Stable gait   Psychiatric:         Mood and Affect: Mood normal.         Behavior: Behavior normal.       ???????????????????????????????????????????????????????????????  Assessment and  Plan:  83 year-old male presents to the emergency department with complaints of hematuria today.  Hemodynamically stable on arrival, no active bleeding on exam.  Basic labs were ordered along with coagulation studies and urinalysis to evaluate for urinary tract infection.  No abdominal tenderness, low concern for acute surgical abnormality, no indication for CT imaging at this time.  If work-up is benign today, will need outpatient follow-up with urology for uroscopy to rule out bladder mass.    ED Course:  Lab work largely unremarkable.  Has leukocyte esterase and bacteria in urine, borderline for infection.  Will treat empirically with cephalexin.  Slight ANDRES on CKD, appears to be gradually progressive, was ordered IV fluids for rehydration.  No pain at this time, no indication for CT imaging to evaluate for nephrolithiasis or other acute intra-abdominal process.  Provided information for urology patient comes outpatient follow-up and instructed on importance of following up for cystoscopy or further workup.    Social Determinants Limiting Care:  None identified    Disposition:  Discharge home    --  Haim Villegas MD  Emergency Medicine, PGY-3         Procedures ? Smart[employer] last updated 5/19/2024 3:39 PM        Haim Villegas MD  Resident  05/19/24 [id number]

## 2024-05-19 NOTE — DISCHARGE INSTRUCTIONS
Take antibiotics as prescribed.  Follow-up with urology, call number below to make an appointment to be seen as soon as possible.  Return to the emergency department if you become dizzy or light-headed, or if you have any other acute concerns.    --   [facility]   [facility] [place]  Phone: [phone]

## 2024-06-18 ENCOUNTER — OFFICE VISIT (OUTPATIENT)
Dept: UROLOGY | Facility: HOSPITAL | Age: 84
End: 2024-06-18
Payer: MEDICARE

## 2024-06-18 DIAGNOSIS — R39.9 LOWER URINARY TRACT SYMPTOMS (LUTS): ICD-10-CM

## 2024-06-18 DIAGNOSIS — R31.0 GROSS HEMATURIA: Primary | ICD-10-CM

## 2024-06-18 LAB
POC APPEARANCE, URINE: CLEAR
POC BILIRUBIN, URINE: NEGATIVE
POC BLOOD, URINE: NEGATIVE
POC COLOR, URINE: YELLOW
POC GLUCOSE, URINE: NEGATIVE MG/DL
POC KETONES, URINE: NEGATIVE MG/DL
POC LEUKOCYTES, URINE: NEGATIVE
POC NITRITE,URINE: NEGATIVE
POC PH, URINE: 5.5 PH
POC PROTEIN, URINE: NEGATIVE MG/DL
POC SPECIFIC GRAVITY, URINE: 1.02
POC UROBILINOGEN, URINE: 0.2 EU/DL

## 2024-06-18 PROCEDURE — 99214 OFFICE O/P EST MOD 30 MIN: CPT | Performed by: STUDENT IN AN ORGANIZED HEALTH CARE EDUCATION/TRAINING PROGRAM

## 2024-06-18 PROCEDURE — 1036F TOBACCO NON-USER: CPT | Performed by: STUDENT IN AN ORGANIZED HEALTH CARE EDUCATION/TRAINING PROGRAM

## 2024-06-18 PROCEDURE — 1126F AMNT PAIN NOTED NONE PRSNT: CPT | Performed by: STUDENT IN AN ORGANIZED HEALTH CARE EDUCATION/TRAINING PROGRAM

## 2024-06-18 PROCEDURE — 81003 URINALYSIS AUTO W/O SCOPE: CPT | Mod: QW | Performed by: STUDENT IN AN ORGANIZED HEALTH CARE EDUCATION/TRAINING PROGRAM

## 2024-06-18 PROCEDURE — 99204 OFFICE O/P NEW MOD 45 MIN: CPT | Performed by: STUDENT IN AN ORGANIZED HEALTH CARE EDUCATION/TRAINING PROGRAM

## 2024-06-18 PROCEDURE — 1159F MED LIST DOCD IN RCRD: CPT | Performed by: STUDENT IN AN ORGANIZED HEALTH CARE EDUCATION/TRAINING PROGRAM

## 2024-06-18 PROCEDURE — 1157F ADVNC CARE PLAN IN RCRD: CPT | Performed by: STUDENT IN AN ORGANIZED HEALTH CARE EDUCATION/TRAINING PROGRAM

## 2024-06-18 ASSESSMENT — PAIN SCALES - GENERAL: PAINLEVEL: 0-NO PAIN

## 2024-06-18 NOTE — PROGRESS NOTES
Subjective   Patient ID: Baldev Osorio is a 83 y.o. male    HPI  83 y.o. male who presenting with gross hematuria. He reports noticing blood in his urine, which he initially observed as wetness in his shorts. He denies any pain associated with urination but mentions that he urinates more frequently than usual. He wakes up once or twice at night to urinate. The patient has a significant smoking history, having smoked two packs a day for many years. There is a concern for potential bladder cancer due to his smoking history. A cystoscopy and renal ultrasound are recommended to evaluate the source of the hematuria and rule out bladder cancer.    The most recent Urinalysis, conducted on 6/18/2024, revealed:  Normal values       Review of Systems    All systems were reviewed. Anything negative was noted in the HPI.    Objective   Physical Exam    General: Well developed, well nourished, alert and cooperative, appears in no acute distress   Eyes: Non-injected conjunctiva, sclera clear, no proptosis   Cardiac: Extremities are warm and well perfused. No edema, cyanosis or pallor   Lungs: Breathing is easy, non-labored. Speaking in clear and complete sentences. Normal diaphragmatic movement   MSK: Ambulatory with steady gait, unassisted   Neuro: Alert and oriented to person, place, and time   Psych: Demonstrates good judgment and reason, without hallucinations, abnormal affect or abnormal behaviors   Skin: No obvious lesions, no rashes       No CVA tenderness bilaterally   No suprapubic pain or discomfort       Past Medical History:   Diagnosis Date    Body mass index (BMI) 35.0-35.9, adult 03/26/2021    Body mass index (BMI) of 35.0 to 35.9    Body mass index (BMI) 36.0-36.9, adult 10/01/2021    Body mass index (BMI) of 36.0 to 36.9    Body mass index (BMI) 37.0-37.9, adult 08/29/2022    Body mass index (BMI) of 37.0 to 37.9 in adult    Body mass index (BMI) 37.0-37.9, adult 10/09/2020    BMI 37.0-37.9, adult    Body mass  index (BMI) 38.0-38.9, adult 01/19/2021    Body mass index (BMI) of 38.0 to 38.9 in adult    Body mass index (BMI) 38.0-38.9, adult 08/14/2020    Body mass index (BMI) of 38.0 to 38.9 in adult    Body mass index (BMI) 39.0-39.9, adult 07/09/2020    Body mass index (BMI) of 39.0 to 39.9 in adult    Body mass index (BMI)40.0-44.9, adult 12/31/2019    Body mass index (BMI) of 40.0 to 44.9 in adult    Essential (primary) hypertension 03/27/2013    Benign essential hypertension    Furuncle of limb, unspecified 01/27/2014    Furuncle of axilla    Hyperlipidemia, unspecified 05/20/2022    Hyperlipidemia    Inflammatory disorders of scrotum 10/25/2017    Scrotal abscess    Morbid (severe) obesity due to excess calories (Multi) 08/29/2022    Class 2 severe obesity with serious comorbidity and body mass index (BMI) of 37.0 to 37.9 in adult, unspecified obesity type    Morbid (severe) obesity due to excess calories (Multi) 05/20/2022    Class 2 severe obesity with serious comorbidity and body mass index (BMI) of 36.0 to 36.9 in adult, unspecified obesity type    Obesity, unspecified 10/01/2021    Class 2 obesity in adult    Other conditions influencing health status     Acute Myocardial Infarction    Personal history of diseases of the blood and blood-forming organs and certain disorders involving the immune mechanism 08/02/2017    History of iron deficiency anemia    Personal history of other diseases of the digestive system     History of hiatal hernia    Personal history of other diseases of the nervous system and sense organs     History of glaucoma    Personal history of other endocrine, nutritional and metabolic disease     History of thyroid disease    Personal history of other endocrine, nutritional and metabolic disease 11/15/2022    History of Graves' disease    Personal history of other endocrine, nutritional and metabolic disease 05/11/2015    History of type 2 diabetes mellitus    Rash and other nonspecific skin  eruption 01/17/2014    Rash    Solitary pulmonary nodule 11/27/2018    Lung nodule         Past Surgical History:   Procedure Laterality Date    CARDIAC CATHETERIZATION  06/02/2013    Cardiac Cath Procedure Summary    COLONOSCOPY  06/06/2017    Complete Colonoscopy    HERNIA REPAIR  06/02/2013    Hernia Repair    MR HEAD ANGIO WO IV CONTRAST  1/11/2021    MR HEAD ANGIO WO IV CONTRAST 1/11/2021 [facility]    MR NECK ANGIO WO IV CONTRAST  1/11/2021    MR NECK ANGIO WO IV CONTRAST 1/11/2021 [facility]    OTHER SURGICAL HISTORY  06/02/2013    Cardiac Cath Lesion 1, 1st Adjunct Treat Device: Stent    OTHER SURGICAL HISTORY  11/29/2021    Cervical laminectomy    OTHER SURGICAL HISTORY  03/03/2021    Posterior cervical vertebral fusion           Assessment/Plan   Gross hematuria    83 y.o. male who presents for the above condition, We had a very long and extensive discussion regarding gross hematuria. I explained to the patient the pathophysiology, differential diagnosis, risk factor, associated conditions, and management. We discussed the need to do gross hematuria work-up to rule out any underlying  malignancies in the form of masses, tumor, polyps that might be causing the gross hematuria. We discussed at length the risk, benefit, potential complication, adverse events of cystoscopy, ultrasound kidneys, and urine cytology. Patient verbalized understanding and would like to proceed.      Plan:  - Renal US  - Cystoscopy  - Follow up in 1 month  - Follow up In 6 months if the patient decides not to undergo the cystoscopy AMA and renal ultrasound now      6/18/2024    Scribe Attestation  By signing my name below, INeva Scribe   attest that this documentation has been prepared under the direction and in the presence of Dr. Mars Erwin

## 2024-06-28 ENCOUNTER — HOSPITAL ENCOUNTER (OUTPATIENT)
Dept: RADIOLOGY | Facility: HOSPITAL | Age: 84
Discharge: HOME | End: 2024-06-28
Payer: MEDICARE

## 2024-06-28 DIAGNOSIS — R31.0 GROSS HEMATURIA: ICD-10-CM

## 2024-06-28 PROCEDURE — 76770 US EXAM ABDO BACK WALL COMP: CPT

## 2024-07-08 DIAGNOSIS — I50.20 HFREF (HEART FAILURE WITH REDUCED EJECTION FRACTION) (MULTI): ICD-10-CM

## 2024-07-08 DIAGNOSIS — E78.2 MIXED HYPERLIPIDEMIA: ICD-10-CM

## 2024-07-08 DIAGNOSIS — I10 PRIMARY HYPERTENSION: ICD-10-CM

## 2024-07-08 DIAGNOSIS — E78.5 HYPERLIPIDEMIA, UNSPECIFIED HYPERLIPIDEMIA TYPE: ICD-10-CM

## 2024-07-08 RX ORDER — EZETIMIBE 10 MG/1
10 TABLET ORAL DAILY
Qty: 90 TABLET | Refills: 0 | Status: SHIPPED | OUTPATIENT
Start: 2024-07-08

## 2024-07-08 RX ORDER — ATORVASTATIN CALCIUM 80 MG/1
80 TABLET, FILM COATED ORAL DAILY
Qty: 90 TABLET | Refills: 0 | Status: SHIPPED | OUTPATIENT
Start: 2024-07-08

## 2024-07-08 RX ORDER — FUROSEMIDE 20 MG/1
20 TABLET ORAL DAILY PRN
Qty: 90 TABLET | Refills: 0 | Status: SHIPPED | OUTPATIENT
Start: 2024-07-08

## 2024-07-08 RX ORDER — LISINOPRIL 20 MG/1
20 TABLET ORAL DAILY
Qty: 90 TABLET | Refills: 0 | Status: SHIPPED | OUTPATIENT
Start: 2024-07-08

## 2024-07-16 ENCOUNTER — PROCEDURE VISIT (OUTPATIENT)
Dept: UROLOGY | Facility: HOSPITAL | Age: 84
End: 2024-07-16
Payer: MEDICARE

## 2024-07-16 DIAGNOSIS — R39.9 LOWER URINARY TRACT SYMPTOMS (LUTS): Primary | ICD-10-CM

## 2024-07-16 DIAGNOSIS — Z79.2 PROPHYLACTIC ANTIBIOTIC: ICD-10-CM

## 2024-07-16 DIAGNOSIS — R31.0 GROSS HEMATURIA: ICD-10-CM

## 2024-07-16 PROCEDURE — 52000 CYSTOURETHROSCOPY: CPT | Performed by: STUDENT IN AN ORGANIZED HEALTH CARE EDUCATION/TRAINING PROGRAM

## 2024-07-16 PROCEDURE — 99213 OFFICE O/P EST LOW 20 MIN: CPT | Performed by: STUDENT IN AN ORGANIZED HEALTH CARE EDUCATION/TRAINING PROGRAM

## 2024-07-16 NOTE — PROGRESS NOTES
Subjective   Patient ID: Baldev Osorio is a 83 y.o. male    HPI  83 y.o. male who presenting for cystoscopic evaluation with gross hematuria. He reports noticing blood in his urine, which he initially observed as wetness in his shorts. He denies any pain associated with urination but mentions that he urinates more frequently than usual. He wakes up once or twice at night to urinate. The patient has a significant smoking history, having smoked two packs a day for many years. There is a concern for potential bladder cancer due to his smoking history. A cystoscopy and renal ultrasound are recommended to evaluate the source of the hematuria and rule out bladder cancer.    The most recent Urinalysis, conducted on 6/18/2024, revealed:  Normal values   The most recent Renal US, conducted on 6/28/2024, revealed:  6 x 5 x 7 mm cyst in the upper pole of the left kidney.      Mild dilatation of the left renal pelvis.      The urinary bladder is unremarkable though limited by poor  distention. The postvoid residual volume is 22 cc.      Review of Systems    All systems were reviewed. Anything negative was noted in the HPI.    Objective   Physical Exam    General: Well developed, well nourished, alert and cooperative, appears in no acute distress   Eyes: Non-injected conjunctiva, sclera clear, no proptosis   Cardiac: Extremities are warm and well perfused. No edema, cyanosis or pallor   Lungs: Breathing is easy, non-labored. Speaking in clear and complete sentences. Normal diaphragmatic movement   MSK: Ambulatory with steady gait, unassisted   Neuro: Alert and oriented to person, place, and time   Psych: Demonstrates good judgment and reason, without hallucinations, abnormal affect or abnormal behaviors   Skin: No obvious lesions, no rashes       No CVA tenderness bilaterally   No suprapubic pain or discomfort       Past Medical History:   Diagnosis Date   • Body mass index (BMI) 35.0-35.9, adult 03/26/2021    Body mass index  (BMI) of 35.0 to 35.9   • Body mass index (BMI) 36.0-36.9, adult 10/01/2021    Body mass index (BMI) of 36.0 to 36.9   • Body mass index (BMI) 37.0-37.9, adult 08/29/2022    Body mass index (BMI) of 37.0 to 37.9 in adult   • Body mass index (BMI) 37.0-37.9, adult 10/09/2020    BMI 37.0-37.9, adult   • Body mass index (BMI) 38.0-38.9, adult 01/19/2021    Body mass index (BMI) of 38.0 to 38.9 in adult   • Body mass index (BMI) 38.0-38.9, adult 08/14/2020    Body mass index (BMI) of 38.0 to 38.9 in adult   • Body mass index (BMI) 39.0-39.9, adult 07/09/2020    Body mass index (BMI) of 39.0 to 39.9 in adult   • Body mass index (BMI)40.0-44.9, adult 12/31/2019    Body mass index (BMI) of 40.0 to 44.9 in adult   • Essential (primary) hypertension 03/27/2013    Benign essential hypertension   • Furuncle of limb, unspecified 01/27/2014    Furuncle of axilla   • Hyperlipidemia, unspecified 05/20/2022    Hyperlipidemia   • Inflammatory disorders of scrotum 10/25/2017    Scrotal abscess   • Morbid (severe) obesity due to excess calories (Multi) 08/29/2022    Class 2 severe obesity with serious comorbidity and body mass index (BMI) of 37.0 to 37.9 in adult, unspecified obesity type   • Morbid (severe) obesity due to excess calories (Multi) 05/20/2022    Class 2 severe obesity with serious comorbidity and body mass index (BMI) of 36.0 to 36.9 in adult, unspecified obesity type   • Obesity, unspecified 10/01/2021    Class 2 obesity in adult   • Other conditions influencing health status     Acute Myocardial Infarction   • Personal history of diseases of the blood and blood-forming organs and certain disorders involving the immune mechanism 08/02/2017    History of iron deficiency anemia   • Personal history of other diseases of the digestive system     History of hiatal hernia   • Personal history of other diseases of the nervous system and sense organs     History of glaucoma   • Personal history of other endocrine, nutritional  and metabolic disease     History of thyroid disease   • Personal history of other endocrine, nutritional and metabolic disease 11/15/2022    History of Graves' disease   • Personal history of other endocrine, nutritional and metabolic disease 05/11/2015    History of type 2 diabetes mellitus   • Rash and other nonspecific skin eruption 01/17/2014    Rash   • Solitary pulmonary nodule 11/27/2018    Lung nodule         Past Surgical History:   Procedure Laterality Date   • CARDIAC CATHETERIZATION  06/02/2013    Cardiac Cath Procedure Summary   • COLONOSCOPY  06/06/2017    Complete Colonoscopy   • HERNIA REPAIR  06/02/2013    Hernia Repair   • MR HEAD ANGIO WO IV CONTRAST  1/11/2021    MR HEAD ANGIO WO IV CONTRAST 1/11/2021 [facility]   • MR NECK ANGIO WO IV CONTRAST  1/11/2021    MR NECK ANGIO WO IV CONTRAST 1/11/2021 [facility]   • OTHER SURGICAL HISTORY  06/02/2013    Cardiac Cath Lesion 1, 1st Adjunct Treat Device: Stent   • OTHER SURGICAL HISTORY  11/29/2021    Cervical laminectomy   • OTHER SURGICAL HISTORY  03/03/2021    Posterior cervical vertebral fusion       Procedure:  The patient was prepped using a Betadine solution. Lidocaine jelly was instilled into the urethra. The flexible cystoscope was sterilely inserted into the urethra and formal cystoscopy performed in a systematic fashion. For detailed findings of the procedure, please see Dr. Erwin’s remarks below  Scope A used, Cipro 500 mg p.o. given      Assessment/Plan   Cystoscopic evaluation for Gross hematuria    83 y.o. male who presents for the above condition, We had a very long and extensive discussion regarding gross hematuria. I explained to the patient the pathophysiology, differential diagnosis, risk factor, associated conditions, and management. We discussed the need to do gross hematuria work-up to rule out any underlying  malignancies in the form of masses, tumor, polyps that might be causing the gross hematuria. We  discussed at length the risk, benefit, potential complication, adverse events of cystoscopy, ultrasound kidneys, and urine cytology. Patient verbalized understanding and would like to proceed.      Plan:  -       7/16/2024    Scribe Attestation  By signing my name below, INeva Scribe   attest that this documentation has been prepared under the direction and in the presence of Dr. Mars Erwin

## 2024-07-17 RX ORDER — FINASTERIDE 5 MG/1
5 TABLET, FILM COATED ORAL DAILY
Qty: 30 TABLET | Refills: 2 | Status: SHIPPED | OUTPATIENT
Start: 2024-07-17 | End: 2024-10-15

## 2024-07-17 RX ORDER — CIPROFLOXACIN 500 MG/1
500 TABLET ORAL ONCE
Status: SHIPPED | OUTPATIENT
Start: 2024-07-17

## 2024-07-17 NOTE — PROGRESS NOTES
Patient ID: Baldev Osorio is a 83 y.o. male.    Cystoscopy    Date/Time: 7/17/2024 9:59 AM    Performed by: Mars Erwin MD MPH  Authorized by: Mars Erwin MD MPH    Procedure - Bladder Cystoscopy:     Procedure details: cystoscopy    PROCEDURE NOTE:    PREOPERATIVE DIAGNOSIS:  Hx of bladder TCC    POSTOPERATIVE DIAGNOSIS:  Same    OPERATION:  Flexible Cystourethroscopy      SURGEON:  Mars Erwin MD MPH    ANESTHESIA:  2%  lidocaine jelly    COMPLICATIONS:  None    EBL: Minimal      DISPOSITION:  The patient was discharged home after the procedure, per routine.    INDICATIONS: :  Mr. Osorio is a 83 y.o. patient with a history of Hx of bladder TCC who presents today for Cystoscopy.     The indications, risks and benefits of this procedure were discussed with the patient, consent was obtained prior to the procedure, and to the best of my judgement the patient seemed to understand and agree to the procedure.    PROCEDURE:  The patient  was brought into the procedure suite and informed consent was reviewed and confirmed. Vital signs were obtained prior to the procedure: There were no vitals taken for this visit..  The patient was escorted onto the stretcher, placed supine, prepped with betadine and draped in the usual standard surgical fashion.  Intraurethral 2% viscous lidocaine jelly was used for local analgesia.  A 16 Divehi flexible cystourethroscope was inserted into the urethra.   The penile urethra was normal.  The prostate urethra was enlarged.  Upon entering the bladder the entire bladder was surveyed in a 360 degree fashion.  The left and right ureteral orifices were in normal orthotopic position effluxing clear yellow urine, bilaterally.   There was no evidence of any bladder lesions, foreign objects, stones or evidence of any mucosal changes. The cystoscope was then retroflexed.  The bladder neck was then further examined without any evidence of lesions. The scope was then removed and in  an antegrade fashion, the urethra and bladder were again resurveyed with no evidence of additional lesions.  The cystoscope was then fully removed.   The patient tolerated the procedure well.  Vitals were stable after the procedure.  The patient was able to void and was discharged home.  Verbal and written Post procedure instructions were reviewed with the patient.    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 Sommers for PCNL

## 2024-07-29 ENCOUNTER — APPOINTMENT (OUTPATIENT)
Dept: PRIMARY CARE | Facility: CLINIC | Age: 84
End: 2024-07-29
Payer: MEDICARE

## 2024-07-29 ENCOUNTER — LAB (OUTPATIENT)
Dept: LAB | Facility: LAB | Age: 84
End: 2024-07-29
Payer: MEDICARE

## 2024-07-29 VITALS — WEIGHT: 221 LBS | BODY MASS INDEX: 36.78 KG/M2 | SYSTOLIC BLOOD PRESSURE: 90 MMHG | DIASTOLIC BLOOD PRESSURE: 70 MMHG

## 2024-07-29 DIAGNOSIS — E03.8 OTHER SPECIFIED HYPOTHYROIDISM: ICD-10-CM

## 2024-07-29 DIAGNOSIS — E11.9 TYPE 2 DIABETES MELLITUS WITHOUT COMPLICATION, WITHOUT LONG-TERM CURRENT USE OF INSULIN (MULTI): ICD-10-CM

## 2024-07-29 DIAGNOSIS — N18.31 STAGE 3A CHRONIC KIDNEY DISEASE (MULTI): ICD-10-CM

## 2024-07-29 DIAGNOSIS — R09.81 NASAL CONGESTION: ICD-10-CM

## 2024-07-29 DIAGNOSIS — E78.5 HYPERLIPIDEMIA, UNSPECIFIED HYPERLIPIDEMIA TYPE: Primary | ICD-10-CM

## 2024-07-29 DIAGNOSIS — I10 PRIMARY HYPERTENSION: ICD-10-CM

## 2024-07-29 DIAGNOSIS — N40.1 BENIGN PROSTATIC HYPERPLASIA WITH INCOMPLETE BLADDER EMPTYING: ICD-10-CM

## 2024-07-29 DIAGNOSIS — I50.20 HFREF (HEART FAILURE WITH REDUCED EJECTION FRACTION) (MULTI): ICD-10-CM

## 2024-07-29 DIAGNOSIS — N52.1 ERECTILE DYSFUNCTION DUE TO DISEASES CLASSIFIED ELSEWHERE: ICD-10-CM

## 2024-07-29 DIAGNOSIS — R39.14 BENIGN PROSTATIC HYPERPLASIA WITH INCOMPLETE BLADDER EMPTYING: ICD-10-CM

## 2024-07-29 DIAGNOSIS — I10 ESSENTIAL HYPERTENSION: ICD-10-CM

## 2024-07-29 DIAGNOSIS — E11.22 TYPE 2 DIABETES MELLITUS WITH CHRONIC KIDNEY DISEASE, WITHOUT LONG-TERM CURRENT USE OF INSULIN, UNSPECIFIED CKD STAGE (MULTI): ICD-10-CM

## 2024-07-29 DIAGNOSIS — E03.9 HYPOTHYROIDISM, UNSPECIFIED TYPE: ICD-10-CM

## 2024-07-29 DIAGNOSIS — E78.2 MIXED HYPERLIPIDEMIA: ICD-10-CM

## 2024-07-29 DIAGNOSIS — R39.9 LOWER URINARY TRACT SYMPTOMS (LUTS): ICD-10-CM

## 2024-07-29 PROBLEM — E66.09 OBESITY DUE TO EXCESS CALORIES: Status: RESOLVED | Noted: 2023-05-26 | Resolved: 2024-07-29

## 2024-07-29 PROBLEM — G56.01 ACUTE CARPAL TUNNEL SYNDROME, RIGHT: Status: RESOLVED | Noted: 2023-05-26 | Resolved: 2024-07-29

## 2024-07-29 PROBLEM — G89.29 BACK PAIN, CHRONIC: Status: RESOLVED | Noted: 2023-05-26 | Resolved: 2024-07-29

## 2024-07-29 PROBLEM — G51.0 BELL'S PALSY: Status: RESOLVED | Noted: 2021-01-11 | Resolved: 2024-07-29

## 2024-07-29 PROBLEM — M54.9 BACK PAIN, CHRONIC: Status: RESOLVED | Noted: 2023-05-26 | Resolved: 2024-07-29

## 2024-07-29 LAB
ALBUMIN SERPL BCP-MCNC: 4.2 G/DL (ref 3.4–5)
ANION GAP SERPL CALC-SCNC: 14 MMOL/L (ref 10–20)
BUN SERPL-MCNC: 28 MG/DL (ref 6–23)
CALCIUM SERPL-MCNC: 9.8 MG/DL (ref 8.6–10.6)
CHLORIDE SERPL-SCNC: 103 MMOL/L (ref 98–107)
CO2 SERPL-SCNC: 30 MMOL/L (ref 21–32)
CREAT SERPL-MCNC: 1.43 MG/DL (ref 0.5–1.3)
EGFRCR SERPLBLD CKD-EPI 2021: 49 ML/MIN/1.73M*2
EST. AVERAGE GLUCOSE BLD GHB EST-MCNC: 146 MG/DL
GLUCOSE SERPL-MCNC: 100 MG/DL (ref 74–99)
HBA1C MFR BLD: 6.7 %
PHOSPHATE SERPL-MCNC: 3.8 MG/DL (ref 2.5–4.9)
POTASSIUM SERPL-SCNC: 4.5 MMOL/L (ref 3.5–5.3)
SODIUM SERPL-SCNC: 142 MMOL/L (ref 136–145)

## 2024-07-29 PROCEDURE — 1036F TOBACCO NON-USER: CPT | Performed by: INTERNAL MEDICINE

## 2024-07-29 PROCEDURE — 3074F SYST BP LT 130 MM HG: CPT | Performed by: INTERNAL MEDICINE

## 2024-07-29 PROCEDURE — G2211 COMPLEX E/M VISIT ADD ON: HCPCS | Performed by: INTERNAL MEDICINE

## 2024-07-29 PROCEDURE — 99214 OFFICE O/P EST MOD 30 MIN: CPT | Performed by: INTERNAL MEDICINE

## 2024-07-29 PROCEDURE — 1157F ADVNC CARE PLAN IN RCRD: CPT | Performed by: INTERNAL MEDICINE

## 2024-07-29 PROCEDURE — 3078F DIAST BP <80 MM HG: CPT | Performed by: INTERNAL MEDICINE

## 2024-07-29 PROCEDURE — 80069 RENAL FUNCTION PANEL: CPT

## 2024-07-29 PROCEDURE — 36415 COLL VENOUS BLD VENIPUNCTURE: CPT

## 2024-07-29 PROCEDURE — 83036 HEMOGLOBIN GLYCOSYLATED A1C: CPT

## 2024-07-29 PROCEDURE — 1159F MED LIST DOCD IN RCRD: CPT | Performed by: INTERNAL MEDICINE

## 2024-07-29 RX ORDER — SILDENAFIL 25 MG/1
25 TABLET, FILM COATED ORAL DAILY PRN
Qty: 12 TABLET | Refills: 3 | Status: SHIPPED | OUTPATIENT
Start: 2024-07-29 | End: 2024-07-29 | Stop reason: SDUPTHER

## 2024-07-29 RX ORDER — EZETIMIBE 10 MG/1
10 TABLET ORAL DAILY
Qty: 90 TABLET | Refills: 0 | Status: SHIPPED | OUTPATIENT
Start: 2024-07-29 | End: 2024-07-29 | Stop reason: SDUPTHER

## 2024-07-29 RX ORDER — METOPROLOL SUCCINATE 50 MG/1
50 TABLET, EXTENDED RELEASE ORAL DAILY
Qty: 90 TABLET | Refills: 1 | Status: SHIPPED | OUTPATIENT
Start: 2024-07-29

## 2024-07-29 RX ORDER — NAPROXEN SODIUM 220 MG/1
81 TABLET, FILM COATED ORAL DAILY
Qty: 30 TABLET | Refills: 2 | Status: SHIPPED | OUTPATIENT
Start: 2024-07-29 | End: 2024-10-27

## 2024-07-29 RX ORDER — FUROSEMIDE 20 MG/1
20 TABLET ORAL DAILY PRN
Qty: 90 TABLET | Refills: 0 | Status: SHIPPED | OUTPATIENT
Start: 2024-07-29 | End: 2024-07-29 | Stop reason: SDUPTHER

## 2024-07-29 RX ORDER — CHOLECALCIFEROL (VITAMIN D3) 25 MCG
1000 TABLET ORAL DAILY
Qty: 30 TABLET | Refills: 2 | Status: SHIPPED | OUTPATIENT
Start: 2024-07-29 | End: 2024-10-27

## 2024-07-29 RX ORDER — LISINOPRIL 20 MG/1
20 TABLET ORAL DAILY
Qty: 90 TABLET | Refills: 0 | Status: SHIPPED | OUTPATIENT
Start: 2024-07-29 | End: 2024-07-29 | Stop reason: SDUPTHER

## 2024-07-29 RX ORDER — LEVOTHYROXINE SODIUM 75 UG/1
75 TABLET ORAL DAILY
Qty: 90 TABLET | Refills: 3 | Status: SHIPPED | OUTPATIENT
Start: 2024-07-29 | End: 2025-07-29

## 2024-07-29 RX ORDER — EZETIMIBE 10 MG/1
10 TABLET ORAL DAILY
Qty: 90 TABLET | Refills: 0 | Status: SHIPPED | OUTPATIENT
Start: 2024-07-29

## 2024-07-29 RX ORDER — METOPROLOL SUCCINATE 50 MG/1
50 TABLET, EXTENDED RELEASE ORAL DAILY
Qty: 90 TABLET | Refills: 1 | Status: SHIPPED | OUTPATIENT
Start: 2024-07-29 | End: 2024-07-29 | Stop reason: SDUPTHER

## 2024-07-29 RX ORDER — SILDENAFIL 25 MG/1
25 TABLET, FILM COATED ORAL DAILY PRN
Qty: 12 TABLET | Refills: 3 | Status: SHIPPED | OUTPATIENT
Start: 2024-07-29 | End: 2025-07-29

## 2024-07-29 RX ORDER — FUROSEMIDE 20 MG/1
20 TABLET ORAL DAILY PRN
Qty: 90 TABLET | Refills: 0 | Status: SHIPPED | OUTPATIENT
Start: 2024-07-29

## 2024-07-29 RX ORDER — LEVOTHYROXINE SODIUM 75 UG/1
75 TABLET ORAL DAILY
Qty: 90 TABLET | Refills: 3 | Status: SHIPPED | OUTPATIENT
Start: 2024-07-29 | End: 2024-07-29 | Stop reason: SDUPTHER

## 2024-07-29 RX ORDER — ATORVASTATIN CALCIUM 80 MG/1
80 TABLET, FILM COATED ORAL DAILY
Qty: 90 TABLET | Refills: 0 | Status: SHIPPED | OUTPATIENT
Start: 2024-07-29 | End: 2024-07-29 | Stop reason: SDUPTHER

## 2024-07-29 RX ORDER — MINERAL OIL
180 ENEMA (ML) RECTAL DAILY
Qty: 30 TABLET | Refills: 0 | Status: SHIPPED | OUTPATIENT
Start: 2024-07-29 | End: 2024-08-28

## 2024-07-29 RX ORDER — FINASTERIDE 5 MG/1
5 TABLET, FILM COATED ORAL DAILY
Qty: 30 TABLET | Refills: 2 | Status: SHIPPED | OUTPATIENT
Start: 2024-07-29 | End: 2024-10-27

## 2024-07-29 RX ORDER — TAMSULOSIN HYDROCHLORIDE 0.4 MG/1
0.4 CAPSULE ORAL DAILY
Qty: 30 CAPSULE | Refills: 11 | Status: SHIPPED | OUTPATIENT
Start: 2024-07-29 | End: 2024-07-29 | Stop reason: SDUPTHER

## 2024-07-29 RX ORDER — ATORVASTATIN CALCIUM 80 MG/1
80 TABLET, FILM COATED ORAL DAILY
Qty: 90 TABLET | Refills: 0 | Status: SHIPPED | OUTPATIENT
Start: 2024-07-29

## 2024-07-29 RX ORDER — METFORMIN HYDROCHLORIDE 500 MG/1
500 TABLET, EXTENDED RELEASE ORAL
Qty: 90 TABLET | Refills: 1 | Status: SHIPPED | OUTPATIENT
Start: 2024-07-29

## 2024-07-29 RX ORDER — TAMSULOSIN HYDROCHLORIDE 0.4 MG/1
0.4 CAPSULE ORAL DAILY
Qty: 30 CAPSULE | Refills: 11 | Status: SHIPPED | OUTPATIENT
Start: 2024-07-29 | End: 2025-07-29

## 2024-07-29 RX ORDER — LISINOPRIL 20 MG/1
20 TABLET ORAL DAILY
Qty: 90 TABLET | Refills: 0 | Status: SHIPPED | OUTPATIENT
Start: 2024-07-29

## 2024-07-29 RX ORDER — METFORMIN HYDROCHLORIDE 500 MG/1
500 TABLET, EXTENDED RELEASE ORAL
Qty: 90 TABLET | Refills: 1 | Status: SHIPPED | OUTPATIENT
Start: 2024-07-29 | End: 2024-07-29 | Stop reason: SDUPTHER

## 2024-07-29 RX ORDER — GABAPENTIN 300 MG/1
300 CAPSULE ORAL 2 TIMES DAILY
Qty: 90 CAPSULE | Refills: 1 | Status: SHIPPED | OUTPATIENT
Start: 2024-07-29

## 2024-07-29 ASSESSMENT — ENCOUNTER SYMPTOMS
DIZZINESS: 0
CHILLS: 0
PALPITATIONS: 0
NUMBNESS: 0
SHORTNESS OF BREATH: 0
DYSURIA: 0
COUGH: 0
FEVER: 0
VOMITING: 0
WHEEZING: 0
ABDOMINAL DISTENTION: 0
ABDOMINAL PAIN: 0
WOUND: 0
DIFFICULTY URINATING: 0
DIARRHEA: 0
LIGHT-HEADEDNESS: 0
NAUSEA: 0

## 2024-07-29 NOTE — ASSESSMENT & PLAN NOTE
- ECHO (6/2023): EF 50-55%, impaired relaxation of LV diastolic filling, increase in calculated LVEF from previous.  - Current Diuretic Regimen: Lasix 20 mg PRN due to soft BP's  - Beta Blocker: Metoprolol Succinate ER 50 mg QD   - ACE/ARB: Lisinopril 20 mg QD   - Outpatient Cardiologist: Dr. Bhupinder Pruitt next month

## 2024-07-29 NOTE — PROGRESS NOTES
Subjective   Subjective:     History Of Present Illness:  Baldev Osorio is a 83 y.o. 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 Patient was recently in the ED for hematuria and had cystoscopy per urology recommendations with US of kidneys. Today, patient denies experiencing any acute or worsening symptoms and would like to have medications refilled.  Otherwise, patient is doing fine.     Past Medical History:  He has a past medical history of Acute carpal tunnel syndrome, right (05/26/2023), Body mass index (BMI) 35.0-35.9, adult (03/26/2021), Body mass index (BMI) 36.0-36.9, adult (10/01/2021), Body mass index (BMI) 37.0-37.9, adult (08/29/2022), Body mass index (BMI) 37.0-37.9, adult (10/09/2020), Body mass index (BMI) 38.0-38.9, adult (01/19/2021), Body mass index (BMI) 38.0-38.9, adult (08/14/2020), Body mass index (BMI) 39.0-39.9, adult (07/09/2020), Body mass index (BMI)40.0-44.9, adult (12/31/2019), Essential (primary) hypertension (03/27/2013), Furuncle of limb, unspecified (01/27/2014), Hyperlipidemia, unspecified (05/20/2022), Inflammatory disorders of scrotum (10/25/2017), Morbid (severe) obesity due to excess calories (Multi) (08/29/2022), Morbid (severe) obesity due to excess calories (Multi) (05/20/2022), Obesity, unspecified (10/01/2021), Other conditions influencing health status, Personal history of diseases of the blood and blood-forming organs and certain disorders involving the immune mechanism (08/02/2017), Personal history of other diseases of the digestive system, Personal history of other diseases of the nervous system and sense organs, Personal history of other endocrine, nutritional and metabolic disease, Personal history of other endocrine, nutritional and metabolic disease (11/15/2022), Personal history of other endocrine, nutritional and metabolic disease  (05/11/2015), Rash and other nonspecific skin eruption (01/17/2014), and Solitary pulmonary nodule (11/27/2018).    Past Surgical History:  He has a past surgical history that includes Hernia repair (06/02/2013); Other surgical history (06/02/2013); Cardiac catheterization (06/02/2013); Other surgical history (11/29/2021); Other surgical history (03/03/2021); Colonoscopy (06/06/2017); MR angio head wo IV contrast (1/11/2021); and MR angio neck wo IV contrast (1/11/2021).     Social History:  He reports that he has never smoked. He has never used smokeless tobacco. He reports that he does not currently use alcohol. He reports that he does not use drugs.    Family History:  Family History   Problem Relation Name Age of Onset    Heart disease Mother      Thyroid disease Mother      Colon cancer Father      Kidney failure Father      Thyroid disease Sister      Other (grand mal seizures) Sister      Heart attack Brother      Prostate cancer Other         Allergies:  House dust    Home Medications:  (Not in a hospital admission)    Review Of Systems:  11-point ROS was performed and is negative except as noted below and in the HPI.     Review of Systems   Constitutional:  Negative for chills and fever.   Respiratory:  Negative for cough, shortness of breath and wheezing.    Cardiovascular:  Negative for chest pain, palpitations and leg swelling.   Gastrointestinal:  Negative for abdominal distention, abdominal pain, diarrhea, nausea and vomiting.   Genitourinary:  Negative for difficulty urinating and dysuria.   Skin:  Negative for rash and wound.   Neurological:  Negative for dizziness, light-headedness and numbness.        Objective   Objective:     BP 90/70   Wt 100 kg (221 lb)   BMI 36.78 kg/m²     Physical Exam  Constitutional:       Appearance: Normal appearance.   HENT:      Head: Normocephalic and atraumatic.      Mouth/Throat:      Mouth: Mucous membranes are moist.   Eyes:      Conjunctiva/sclera: Conjunctivae  normal.      Pupils: Pupils are equal, round, and reactive to light.   Cardiovascular:      Rate and Rhythm: Normal rate and regular rhythm.      Heart sounds: Normal heart sounds.   Pulmonary:      Effort: No respiratory distress.      Breath sounds: Normal breath sounds. No wheezing or rhonchi.   Abdominal:      General: Bowel sounds are normal.      Palpations: Abdomen is soft.      Tenderness: There is no abdominal tenderness.   Musculoskeletal:         General: No swelling.      Cervical back: Neck supple.      Right lower leg: Edema present.      Left lower leg: Edema present.   Skin:     General: Skin is warm and dry.   Neurological:      General: No focal deficit present.      Mental Status: He is alert and oriented to person, place, and time. Mental status is at baseline.          Assessment & Plan:     Assessment/Plan     Problem List Items Addressed This Visit       Chronic kidney disease (CKD), stage III (moderate) (Multi)     - Outpatient Nephrologist: Dr. Díaz         Relevant Orders    Renal function panel    Essential hypertension     - Current Regimen: Lisinopril 20 mg QD, Furosemide 20 mg, Metoprolol Succinate ER 50 mg QD  - Continue to monitor BP, daily BP's          Hypothyroidism     - continue Levothyroxine          Relevant Medications    levothyroxine (Synthroid, Levoxyl) 75 mcg tablet    Type 2 diabetes mellitus with diabetic chronic kidney disease (Multi)     - HbA1c 4/2024: 6.4% --> repeat ordered today  - Current Regimen: Metformin HCl  mg QD          Relevant Orders    Hemoglobin A1c    HLD (hyperlipidemia) - Primary     - controlled with Atorvastatin 80 mg         Relevant Medications    ezetimibe (Zetia) 10 mg tablet    atorvastatin (Lipitor) 80 mg tablet    Erectile dysfunction due to diseases classified elsewhere     - switched to lower dose Sildenafil 25 mg PRN         Relevant Medications    sildenafil (Viagra) 25 mg tablet    HFrEF (heart failure with reduced ejection  fraction) (Multi)     - ECHO (6/2023): EF 50-55%, impaired relaxation of LV diastolic filling, increase in calculated LVEF from previous.  - Current Diuretic Regimen: Lasix 20 mg PRN due to soft BP's  - Beta Blocker: Metoprolol Succinate ER 50 mg QD   - ACE/ARB: Lisinopril 20 mg QD   - Outpatient Cardiologist: Dr. Bhupinder Pruitt next month         Relevant Medications    furosemide (Lasix) 20 mg tablet    metoprolol succinate XL (Toprol-XL) 50 mg 24 hr tablet    sildenafil (Viagra) 25 mg tablet     Other Visit Diagnoses       Primary hypertension        Relevant Medications    furosemide (Lasix) 20 mg tablet    lisinopril 20 mg tablet    Type 2 diabetes mellitus without complication, without long-term current use of insulin (Multi)        Relevant Medications    metFORMIN  mg 24 hr tablet    Benign prostatic hyperplasia with incomplete bladder emptying        Relevant Medications    tamsulosin (Flomax) 0.4 mg 24 hr capsule    Lower urinary tract symptoms (LUTS)              #Health Maintenance:  - RFP with A1c today  - routine labs at next visit in November    Revisit Topics: none    Dispo: Patient is scheduled to return to clinic in November.    Nolberto Avilez, PGY-3  Internal Medicine     Disclaimer: Documentation completed with the information available at the time of input. The times in the chart may not be reflective of actual patient care times, interventions, or procedures. Documentation occurs after the physical care of the patient.

## 2024-07-29 NOTE — ASSESSMENT & PLAN NOTE
- Current Regimen: Lisinopril 20 mg QD, Furosemide 20 mg, Metoprolol Succinate ER 50 mg QD  - Continue to monitor BP, daily BP's

## 2024-08-13 ENCOUNTER — OFFICE VISIT (OUTPATIENT)
Dept: CARDIOLOGY | Facility: HOSPITAL | Age: 84
End: 2024-08-13
Payer: MEDICARE

## 2024-08-13 VITALS
HEART RATE: 65 BPM | HEIGHT: 65 IN | SYSTOLIC BLOOD PRESSURE: 120 MMHG | DIASTOLIC BLOOD PRESSURE: 70 MMHG | WEIGHT: 223.1 LBS | BODY MASS INDEX: 37.17 KG/M2

## 2024-08-13 DIAGNOSIS — G47.30 SLEEP APNEA, UNSPECIFIED TYPE: ICD-10-CM

## 2024-08-13 DIAGNOSIS — I25.10 CORONARY ARTERY DISEASE, UNSPECIFIED VESSEL OR LESION TYPE, UNSPECIFIED WHETHER ANGINA PRESENT, UNSPECIFIED WHETHER NATIVE OR TRANSPLANTED HEART: Primary | ICD-10-CM

## 2024-08-13 LAB
ATRIAL RATE: 65 BPM
P AXIS: 74 DEGREES
P OFFSET: 184 MS
P ONSET: 139 MS
PR INTERVAL: 152 MS
Q ONSET: 215 MS
QRS COUNT: 10 BEATS
QRS DURATION: 78 MS
QT INTERVAL: 378 MS
QTC CALCULATION(BAZETT): 393 MS
QTC FREDERICIA: 388 MS
R AXIS: -1 DEGREES
T AXIS: 55 DEGREES
T OFFSET: 404 MS
VENTRICULAR RATE: 65 BPM

## 2024-08-13 PROCEDURE — 1159F MED LIST DOCD IN RCRD: CPT | Performed by: INTERNAL MEDICINE

## 2024-08-13 PROCEDURE — 1157F ADVNC CARE PLAN IN RCRD: CPT | Performed by: INTERNAL MEDICINE

## 2024-08-13 PROCEDURE — 1036F TOBACCO NON-USER: CPT | Performed by: INTERNAL MEDICINE

## 2024-08-13 PROCEDURE — 99214 OFFICE O/P EST MOD 30 MIN: CPT | Performed by: INTERNAL MEDICINE

## 2024-08-13 PROCEDURE — 3078F DIAST BP <80 MM HG: CPT | Performed by: INTERNAL MEDICINE

## 2024-08-13 PROCEDURE — 99214 OFFICE O/P EST MOD 30 MIN: CPT | Mod: 25 | Performed by: INTERNAL MEDICINE

## 2024-08-13 PROCEDURE — 3074F SYST BP LT 130 MM HG: CPT | Performed by: INTERNAL MEDICINE

## 2024-08-13 PROCEDURE — 1160F RVW MEDS BY RX/DR IN RCRD: CPT | Performed by: INTERNAL MEDICINE

## 2024-08-13 PROCEDURE — 93010 ELECTROCARDIOGRAM REPORT: CPT | Performed by: INTERNAL MEDICINE

## 2024-08-13 PROCEDURE — 93005 ELECTROCARDIOGRAM TRACING: CPT | Performed by: INTERNAL MEDICINE

## 2024-08-13 NOTE — PROGRESS NOTES
Subjective:  Patient returns for a routine 6-month follow-up.  He is a pleasant gentleman we follow for risk factor management in the setting of known extensive coronary disease as well as prior PCI/stenting.  Most recent cath 4 years ago did not reveal any high-grade disease/restenosis.    He generally has been clinically stable.  He continues to be limited by some rather problematic arthritis.  He denies any clear angina, palpitations or stroke symptomatology.  He has not had any hospitalizations and denies any other new health concerns.  He has been struggling with some peripheral edema but was recently started on some furosemide for this which appears to be helping some.    He remains compliant with his extensive medication list and appears to be tolerating these well.  He overall is reasonably comfortable with how he is doing.  He unfortunately did not get his sleep medicine referral done as requested for evaluation of probable sleep apnea.    Objective:  General: Alert, usual pleasant self.  HEENT: Unchanged.  Lungs: Clear with good air exchange.  Cardiac: Distant heart tones.  Abdomen: Nontender.  Extremities: Mild ankle edema bilaterally.  Skin: No rash.  Neuro: Grossly intact and unchanged.    EKG: Sinus rhythm with PVCs.  No acute changes.    Lipid panel: Cholesterol-118, HDL-51, LDL-51, TG-79.    Impression/plan:  Patient is doing reasonably well at this time.  He is not having any problematic anginal type symptoms to suggest progressive coronary disease, so I did not think we needed to embark on a repeat ischemic workup at this time.    His heart rate and blood pressure remain under excellent control on his current medical regimen, so we will continue these unchanged.    His lipid panel also looks excellent on combination therapy with high-dose statin and Zetia so we will continue these unchanged as well.    I will put in a repeat sleep medicine referral to see if he does have sleep apnea that needs  attention.  I will see him back for routine follow-up in 6 months, but he and his wife know to call for any intercurrent concerns.    Patient instructions:    Continue current medications unchanged.    Report for your sleep medicine evaluation when scheduled.    Return to clinic in 6 months.

## 2024-10-04 DIAGNOSIS — E11.9 TYPE 2 DIABETES MELLITUS WITHOUT COMPLICATION, WITHOUT LONG-TERM CURRENT USE OF INSULIN (MULTI): ICD-10-CM

## 2024-10-04 RX ORDER — GABAPENTIN 300 MG/1
300 CAPSULE ORAL 2 TIMES DAILY
Qty: 180 CAPSULE | Refills: 3 | Status: SHIPPED | OUTPATIENT
Start: 2024-10-04

## 2024-10-13 DIAGNOSIS — R39.9 LOWER URINARY TRACT SYMPTOMS (LUTS): ICD-10-CM

## 2024-10-14 RX ORDER — FINASTERIDE 5 MG/1
5 TABLET, FILM COATED ORAL DAILY
Qty: 90 TABLET | Refills: 3 | Status: SHIPPED | OUTPATIENT
Start: 2024-10-14

## 2024-10-17 ENCOUNTER — APPOINTMENT (OUTPATIENT)
Dept: ENDOCRINOLOGY | Facility: CLINIC | Age: 84
End: 2024-10-17
Payer: MEDICARE

## 2024-10-17 ENCOUNTER — LAB (OUTPATIENT)
Dept: LAB | Facility: LAB | Age: 84
End: 2024-10-17

## 2024-10-17 VITALS
SYSTOLIC BLOOD PRESSURE: 124 MMHG | DIASTOLIC BLOOD PRESSURE: 66 MMHG | HEART RATE: 78 BPM | RESPIRATION RATE: 16 BRPM | BODY MASS INDEX: 36.82 KG/M2 | HEIGHT: 65 IN | WEIGHT: 221 LBS

## 2024-10-17 DIAGNOSIS — E03.9 HYPOTHYROIDISM, UNSPECIFIED TYPE: Primary | ICD-10-CM

## 2024-10-17 DIAGNOSIS — E03.9 HYPOTHYROIDISM, UNSPECIFIED TYPE: ICD-10-CM

## 2024-10-17 DIAGNOSIS — E05.00 GRAVES' EYE DISEASE: ICD-10-CM

## 2024-10-17 DIAGNOSIS — E11.9 TYPE 2 DIABETES MELLITUS WITHOUT COMPLICATION, WITHOUT LONG-TERM CURRENT USE OF INSULIN (MULTI): ICD-10-CM

## 2024-10-17 LAB
T4 FREE SERPL-MCNC: 1.42 NG/DL (ref 0.78–1.48)
TSH SERPL-ACNC: 0.17 MIU/L (ref 0.44–3.98)

## 2024-10-17 PROCEDURE — 84443 ASSAY THYROID STIM HORMONE: CPT

## 2024-10-17 PROCEDURE — G2211 COMPLEX E/M VISIT ADD ON: HCPCS | Performed by: INTERNAL MEDICINE

## 2024-10-17 PROCEDURE — 1159F MED LIST DOCD IN RCRD: CPT | Performed by: INTERNAL MEDICINE

## 2024-10-17 PROCEDURE — 84439 ASSAY OF FREE THYROXINE: CPT

## 2024-10-17 PROCEDURE — 3074F SYST BP LT 130 MM HG: CPT | Performed by: INTERNAL MEDICINE

## 2024-10-17 PROCEDURE — 1036F TOBACCO NON-USER: CPT | Performed by: INTERNAL MEDICINE

## 2024-10-17 PROCEDURE — 36415 COLL VENOUS BLD VENIPUNCTURE: CPT

## 2024-10-17 PROCEDURE — 99214 OFFICE O/P EST MOD 30 MIN: CPT | Performed by: INTERNAL MEDICINE

## 2024-10-17 PROCEDURE — 1160F RVW MEDS BY RX/DR IN RCRD: CPT | Performed by: INTERNAL MEDICINE

## 2024-10-17 PROCEDURE — 3078F DIAST BP <80 MM HG: CPT | Performed by: INTERNAL MEDICINE

## 2024-10-17 PROCEDURE — 1157F ADVNC CARE PLAN IN RCRD: CPT | Performed by: INTERNAL MEDICINE

## 2024-10-17 ASSESSMENT — ENCOUNTER SYMPTOMS
DIZZINESS: 0
LIGHT-HEADEDNESS: 0
NAUSEA: 0
DIARRHEA: 0
FEVER: 0
VOMITING: 0
SHORTNESS OF BREATH: 0
CHILLS: 0

## 2024-10-17 NOTE — PROGRESS NOTES
Endocrinology: Follow up visit  Subjective   Patient ID: Baldev Osorio is a 84 y.o. male who presents for Hypothyroidism, Graves' Disease, and Diabetes (Type 2 ).    PCP: Nataliia Richardson MD    HPI  Here for yearly follow up hypothyroidism s/p clarke for graves  Last year adjusted t4 to 75 mcg  Feeling fine  Trying to work on eating and activity  Down 5 lbs and A1c has been controlled on metformin only  Still with eye irritation and running; has not restarted drops    Review of Systems   Constitutional:  Negative for chills and fever.   Respiratory:  Negative for shortness of breath.    Gastrointestinal:  Negative for diarrhea, nausea and vomiting.   Endocrine: Negative for cold intolerance and heat intolerance.   Neurological:  Negative for dizziness and light-headedness.       Patient Active Problem List   Diagnosis    Apnea, sleep    Coronary artery disease    Bilateral sensorineural hearing loss    Cervical spondylosis with myelopathy    Chronic kidney disease (CKD), stage III (moderate) (Multi)    Chronic rhinitis    COPD (chronic obstructive pulmonary disease) (Multi)    Decreased sense of smell    Deviated nasal septum    Disorder of intervertebral disc of cervical spine    Essential hypertension    Hypothyroidism    Insomnia    Left cervical radiculopathy    Memory loss    Nasal congestion    Organic impotence    Paresthesia of both hands    Suspected sleep apnea    Tingling    Vitamin D deficiency    Type 2 diabetes mellitus with diabetic chronic kidney disease    Presence of coronary angioplasty implant and graft    Personal history of nicotine dependence    Cervical spondylosis with radiculopathy    BMI 38.0-38.9,adult    Chronic cervical radiculopathy    Obesity    Long term (current) use of oral hypoglycemic drugs    Ischemic cardiomyopathy    HLD (hyperlipidemia)    Graves' eye disease    Erectile dysfunction due to diseases classified elsewhere    HFrEF (heart failure with reduced ejection  fraction)        Home Meds:  Current Outpatient Medications   Medication Instructions    albuterol 90 mcg/actuation inhaler 2 puffs    albuterol 2.5 mg, nebulization, Every 8 hours PRN    aspirin 81 mg, oral, Daily    atorvastatin (LIPITOR) 80 mg, oral, Daily    cholecalciferol (VITAMIN D3) 1,000 Units, oral, Daily    ezetimibe (ZETIA) 10 mg, oral, Daily    fexofenadine (ALLEGRA) 180 mg, oral, Daily    finasteride (PROSCAR) 5 mg, oral, Daily, DO NOT CRUSH CHEW OR SPLIT    furosemide (LASIX) 20 mg, oral, Daily PRN    gabapentin (NEURONTIN) 300 mg, oral, 2 times daily    levothyroxine (SYNTHROID, LEVOXYL) 75 mcg, oral, Daily    lisinopril 20 mg, oral, Daily    metFORMIN XR (GLUCOPHAGE-XR) 500 mg, oral, Daily with evening meal    metoprolol succinate XL (TOPROL-XL) 50 mg, oral, Daily    OneTouch Delica Plus Lancet 30 gauge misc 1 each, miscellaneous, Daily    OneTouch Ultra Test strip 1 strip, miscellaneous, Daily    sildenafil (VIAGRA) 25 mg, oral, Daily PRN    tamsulosin (FLOMAX) 0.4 mg, oral, Daily        Allergies   Allergen Reactions    House Dust Unknown        Objective   Vitals:    10/17/24 0912   BP: 124/66   Pulse: 78   Resp: 16      Vitals:    10/17/24 0912   Weight: 100 kg (221 lb)      Body mass index is 36.78 kg/m².   Physical Exam  Constitutional:       Appearance: Normal appearance. He is overweight.   HENT:      Head: Normocephalic and atraumatic.   Neck:      Thyroid: No thyroid mass, thyromegaly or thyroid tenderness.      Comments: Small thyroid gland  Cardiovascular:      Rate and Rhythm: Normal rate and regular rhythm.      Heart sounds: No murmur heard.     No gallop.   Pulmonary:      Effort: Pulmonary effort is normal.      Breath sounds: Normal breath sounds.   Abdominal:      Palpations: Abdomen is soft.      Comments: benign   Neurological:      General: No focal deficit present.      Mental Status: He is alert and oriented to person, place, and time.      Deep Tendon Reflexes: Reflexes are  "normal and symmetric.   Psychiatric:         Behavior: Behavior is cooperative.         Labs:  Lab Results   Component Value Date    HGBA1C 6.7 (H) 07/29/2024    TSH 0.92 04/10/2024    FREET4 0.99 10/17/2023      No results found for: \"PR1\", \"THYROIDPAB\", \"TSI\"     Assessment/Plan   Assessment & Plan  Hypothyroidism, unspecified type  Due for labs today  Orders:    TSH with reflex to Free T4 if abnormal; Future    Graves' eye disease    Encouraged trying drops again  Type 2 diabetes mellitus without complication, without long-term current use of insulin (Multi)    A1c good on metformin only  Follow up in one year      Electronically signed by:  Francia Arrieta MD 10/17/24 9:13 AM              "

## 2024-10-17 NOTE — PATIENT INSTRUCTIONS
Blood work today  Restart saline eye drops  Follow up in one year  Keep working on eating and activity

## 2024-11-18 ENCOUNTER — APPOINTMENT (OUTPATIENT)
Dept: PRIMARY CARE | Facility: CLINIC | Age: 84
End: 2024-11-18
Payer: MEDICARE

## 2024-11-18 VITALS
WEIGHT: 220.6 LBS | HEIGHT: 65 IN | HEART RATE: 84 BPM | SYSTOLIC BLOOD PRESSURE: 116 MMHG | DIASTOLIC BLOOD PRESSURE: 74 MMHG | BODY MASS INDEX: 36.75 KG/M2

## 2024-11-18 DIAGNOSIS — E78.5 HYPERLIPIDEMIA, UNSPECIFIED HYPERLIPIDEMIA TYPE: ICD-10-CM

## 2024-11-18 DIAGNOSIS — E78.2 MIXED HYPERLIPIDEMIA: ICD-10-CM

## 2024-11-18 DIAGNOSIS — I10 ESSENTIAL HYPERTENSION: ICD-10-CM

## 2024-11-18 DIAGNOSIS — E11.9 TYPE 2 DIABETES MELLITUS WITHOUT COMPLICATION, WITHOUT LONG-TERM CURRENT USE OF INSULIN (MULTI): ICD-10-CM

## 2024-11-18 DIAGNOSIS — J44.9 CHRONIC OBSTRUCTIVE PULMONARY DISEASE, UNSPECIFIED COPD TYPE (MULTI): ICD-10-CM

## 2024-11-18 DIAGNOSIS — R73.09 ELEVATED GLUCOSE: ICD-10-CM

## 2024-11-18 DIAGNOSIS — N40.1 BENIGN PROSTATIC HYPERPLASIA WITH INCOMPLETE BLADDER EMPTYING: ICD-10-CM

## 2024-11-18 DIAGNOSIS — R39.14 BENIGN PROSTATIC HYPERPLASIA WITH INCOMPLETE BLADDER EMPTYING: ICD-10-CM

## 2024-11-18 DIAGNOSIS — R73.9 HYPERGLYCEMIA: ICD-10-CM

## 2024-11-18 DIAGNOSIS — I10 PRIMARY HYPERTENSION: ICD-10-CM

## 2024-11-18 DIAGNOSIS — E55.9 HYPOVITAMINOSIS D: ICD-10-CM

## 2024-11-18 DIAGNOSIS — E03.8 OTHER SPECIFIED HYPOTHYROIDISM: ICD-10-CM

## 2024-11-18 DIAGNOSIS — M47.12 CERVICAL SPONDYLOSIS WITH MYELOPATHY: ICD-10-CM

## 2024-11-18 DIAGNOSIS — R39.9 LOWER URINARY TRACT SYMPTOMS (LUTS): ICD-10-CM

## 2024-11-18 DIAGNOSIS — N52.1 ERECTILE DYSFUNCTION DUE TO DISEASES CLASSIFIED ELSEWHERE: ICD-10-CM

## 2024-11-18 DIAGNOSIS — I50.20 HFREF (HEART FAILURE WITH REDUCED EJECTION FRACTION): Primary | ICD-10-CM

## 2024-11-18 DIAGNOSIS — E11.22 TYPE 2 DIABETES MELLITUS WITH CHRONIC KIDNEY DISEASE, WITHOUT LONG-TERM CURRENT USE OF INSULIN, UNSPECIFIED CKD STAGE (MULTI): ICD-10-CM

## 2024-11-18 PROBLEM — N40.0 BENIGN PROSTATIC HYPERPLASIA WITHOUT LOWER URINARY TRACT SYMPTOMS: Status: ACTIVE | Noted: 2024-01-01

## 2024-11-18 PROCEDURE — 3074F SYST BP LT 130 MM HG: CPT | Performed by: INTERNAL MEDICINE

## 2024-11-18 PROCEDURE — 3078F DIAST BP <80 MM HG: CPT | Performed by: INTERNAL MEDICINE

## 2024-11-18 PROCEDURE — 99214 OFFICE O/P EST MOD 30 MIN: CPT | Performed by: INTERNAL MEDICINE

## 2024-11-18 PROCEDURE — 1157F ADVNC CARE PLAN IN RCRD: CPT | Performed by: INTERNAL MEDICINE

## 2024-11-18 PROCEDURE — 1036F TOBACCO NON-USER: CPT | Performed by: INTERNAL MEDICINE

## 2024-11-18 PROCEDURE — G2211 COMPLEX E/M VISIT ADD ON: HCPCS | Performed by: INTERNAL MEDICINE

## 2024-11-18 PROCEDURE — 1159F MED LIST DOCD IN RCRD: CPT | Performed by: INTERNAL MEDICINE

## 2024-11-18 RX ORDER — SILDENAFIL 25 MG/1
50 TABLET, FILM COATED ORAL DAILY PRN
Qty: 12 TABLET | Refills: 3 | Status: SHIPPED | OUTPATIENT
Start: 2024-11-18 | End: 2024-11-18

## 2024-11-18 RX ORDER — FINASTERIDE 5 MG/1
5 TABLET, FILM COATED ORAL DAILY
Qty: 90 TABLET | Refills: 3 | Status: SHIPPED | OUTPATIENT
Start: 2024-11-18 | End: 2024-11-23

## 2024-11-18 RX ORDER — LEVOTHYROXINE SODIUM 75 UG/1
75 TABLET ORAL DAILY
Qty: 90 TABLET | Refills: 3 | Status: SHIPPED | OUTPATIENT
Start: 2024-11-18 | End: 2024-11-23

## 2024-11-18 RX ORDER — METOPROLOL SUCCINATE 50 MG/1
50 TABLET, EXTENDED RELEASE ORAL DAILY
Qty: 90 TABLET | Refills: 1 | Status: SHIPPED | OUTPATIENT
Start: 2024-11-18 | End: 2024-11-23

## 2024-11-18 RX ORDER — SILDENAFIL 50 MG/1
50 TABLET, FILM COATED ORAL DAILY PRN
Qty: 12 TABLET | Refills: 3 | Status: SHIPPED | OUTPATIENT
Start: 2024-11-18 | End: 2024-11-23

## 2024-11-18 RX ORDER — LANCETS 33 GAUGE
1 EACH MISCELLANEOUS DAILY
Qty: 100 EACH | Refills: 3 | Status: SHIPPED | OUTPATIENT
Start: 2024-11-18 | End: 2024-11-23

## 2024-11-18 RX ORDER — FUROSEMIDE 20 MG/1
20 TABLET ORAL DAILY PRN
Qty: 90 TABLET | Refills: 0 | Status: SHIPPED | OUTPATIENT
Start: 2024-11-18 | End: 2024-11-23

## 2024-11-18 RX ORDER — BLOOD SUGAR DIAGNOSTIC
1 STRIP MISCELLANEOUS DAILY
Qty: 100 STRIP | Refills: 3 | Status: SHIPPED | OUTPATIENT
Start: 2024-11-18 | End: 2024-11-23

## 2024-11-18 RX ORDER — GABAPENTIN 300 MG/1
300 CAPSULE ORAL 2 TIMES DAILY
Qty: 180 CAPSULE | Refills: 3 | Status: SHIPPED | OUTPATIENT
Start: 2024-11-18 | End: 2024-11-23

## 2024-11-18 RX ORDER — LISINOPRIL 20 MG/1
20 TABLET ORAL DAILY
Qty: 90 TABLET | Refills: 0 | Status: SHIPPED | OUTPATIENT
Start: 2024-11-18 | End: 2024-11-23

## 2024-11-18 RX ORDER — EZETIMIBE 10 MG/1
10 TABLET ORAL DAILY
Qty: 90 TABLET | Refills: 0 | Status: SHIPPED | OUTPATIENT
Start: 2024-11-18 | End: 2024-11-23

## 2024-11-18 RX ORDER — ATORVASTATIN CALCIUM 80 MG/1
80 TABLET, FILM COATED ORAL DAILY
Qty: 90 TABLET | Refills: 0 | Status: SHIPPED | OUTPATIENT
Start: 2024-11-18 | End: 2024-11-23

## 2024-11-18 RX ORDER — ALBUTEROL SULFATE 0.83 MG/ML
2.5 SOLUTION RESPIRATORY (INHALATION) EVERY 8 HOURS PRN
Qty: 75 ML | Refills: 1 | Status: SHIPPED | OUTPATIENT
Start: 2024-11-18 | End: 2024-11-23

## 2024-11-18 RX ORDER — METFORMIN HYDROCHLORIDE 500 MG/1
500 TABLET, EXTENDED RELEASE ORAL
Qty: 90 TABLET | Refills: 1 | Status: SHIPPED | OUTPATIENT
Start: 2024-11-18 | End: 2024-11-23

## 2024-11-18 RX ORDER — ALBUTEROL SULFATE 90 UG/1
2 INHALANT RESPIRATORY (INHALATION) EVERY 4 HOURS PRN
Qty: 18 G | Refills: 3 | Status: SHIPPED | OUTPATIENT
Start: 2024-11-18 | End: 2024-11-23

## 2024-11-18 RX ORDER — TAMSULOSIN HYDROCHLORIDE 0.4 MG/1
0.4 CAPSULE ORAL DAILY
Qty: 30 CAPSULE | Refills: 11 | Status: SHIPPED | OUTPATIENT
Start: 2024-11-18 | End: 2024-11-23

## 2024-11-18 ASSESSMENT — ENCOUNTER SYMPTOMS
SHORTNESS OF BREATH: 0
DIZZINESS: 0
ABDOMINAL PAIN: 0
FEVER: 0
DIFFICULTY URINATING: 0
MYALGIAS: 0
DYSURIA: 0
DIARRHEA: 0
BACK PAIN: 1
CONFUSION: 0
EYE DISCHARGE: 0
WEAKNESS: 1
FLANK PAIN: 0
CHILLS: 0
NAUSEA: 0
VOMITING: 0
WOUND: 0
WHEEZING: 0
COUGH: 0
NUMBNESS: 1

## 2024-11-18 ASSESSMENT — LIFESTYLE VARIABLES
HOW OFTEN DO YOU HAVE SIX OR MORE DRINKS ON ONE OCCASION: NEVER
HOW OFTEN DO YOU HAVE A DRINK CONTAINING ALCOHOL: NEVER
SKIP TO QUESTIONS 9-10: 1
HOW MANY STANDARD DRINKS CONTAINING ALCOHOL DO YOU HAVE ON A TYPICAL DAY: PATIENT DOES NOT DRINK
AUDIT-C TOTAL SCORE: 0

## 2024-11-18 NOTE — ASSESSMENT & PLAN NOTE
-Continue metformin 500 home with evening meals  -Stable A1c, repeat today  -Follows up with endocrinologist

## 2024-11-18 NOTE — ASSESSMENT & PLAN NOTE
-Will follow up with his neurosurgeon  -Will check folate and vitamin deficiency for neuropathy  -PT referral for osteoarthritic pains

## 2024-11-18 NOTE — ADDENDUM NOTE
Addended by: NATIVIDAD MONTENEGRO on: 11/18/2024 02:31 PM     Modules accepted: Level of Service

## 2024-11-18 NOTE — ASSESSMENT & PLAN NOTE
-Denies recent COPD exacerbations requiring hospitalization  -Controlled with albuterol nebulizer and inhaler

## 2024-11-18 NOTE — PROGRESS NOTES
Subjective   Subjective:     History Of Present Illness:  Baldev Osorio is a 84 y.o. 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  Patient has been complaining of worsening neck pain and bilateral upper extremity numbness and electric shock-like pain radiating from neck into both elbows associated with numbness in both hands.  Numbness is present in the left hand and right thumb with occasional weakness and drop of objects with weak  in the left hand.  His other associated symptoms include persistent rectal dysfunction not relieved by Viagra 25 mg.  States that Viagra helps for a little and wears out too fast.  Denies other contraindications and symptoms such as lightheadedness and dizziness.    Past Medical History:  He has a past medical history of Acute carpal tunnel syndrome, right (05/26/2023), Body mass index (BMI) 35.0-35.9, adult (03/26/2021), Body mass index (BMI) 36.0-36.9, adult (10/01/2021), Body mass index (BMI) 37.0-37.9, adult (08/29/2022), Body mass index (BMI) 37.0-37.9, adult (10/09/2020), Body mass index (BMI) 38.0-38.9, adult (01/19/2021), Body mass index (BMI) 38.0-38.9, adult (08/14/2020), Body mass index (BMI) 39.0-39.9, adult (07/09/2020), Body mass index (BMI)40.0-44.9, adult (12/31/2019), Essential (primary) hypertension (03/27/2013), Furuncle of limb, unspecified (01/27/2014), Hyperlipidemia, unspecified (05/20/2022), Inflammatory disorders of scrotum (10/25/2017), Morbid (severe) obesity due to excess calories (Multi) (08/29/2022), Morbid (severe) obesity due to excess calories (Multi) (05/20/2022), Obesity, unspecified (10/01/2021), Other conditions influencing health status, Personal history of diseases of the blood and blood-forming organs and certain disorders involving the immune mechanism (08/02/2017), Personal history of other diseases  of the digestive system, Personal history of other diseases of the nervous system and sense organs, Personal history of other endocrine, nutritional and metabolic disease, Personal history of other endocrine, nutritional and metabolic disease (11/15/2022), Personal history of other endocrine, nutritional and metabolic disease (05/11/2015), Rash and other nonspecific skin eruption (01/17/2014), and Solitary pulmonary nodule (11/27/2018).    Past Surgical History:  He has a past surgical history that includes Hernia repair (06/02/2013); Other surgical history (06/02/2013); Cardiac catheterization (06/02/2013); Other surgical history (11/29/2021); Other surgical history (03/03/2021); Colonoscopy (06/06/2017); MR angio head wo IV contrast (1/11/2021); and MR angio neck wo IV contrast (1/11/2021).     Social History:  He reports that he has never smoked. He has never used smokeless tobacco. He reports that he does not currently use alcohol. He reports that he does not use drugs.    Family History:  Family History   Problem Relation Name Age of Onset    Heart disease Mother      Thyroid disease Mother      Colon cancer Father      Kidney failure Father      Thyroid disease Sister      Other (grand mal seizures) Sister      Heart attack Brother      Prostate cancer Other       Allergies:  House dust    Home Medications:  (Not in a hospital admission)    Review Of Systems:  11-point ROS was performed and is negative except as noted below and in the HPI.     Review of Systems   Constitutional:  Negative for chills and fever.   Eyes:  Negative for discharge.   Respiratory:  Negative for cough, shortness of breath and wheezing.    Cardiovascular:  Negative for chest pain and leg swelling.   Gastrointestinal:  Negative for abdominal pain, diarrhea, nausea and vomiting.   Genitourinary:  Negative for difficulty urinating, dysuria and flank pain.        Erectile dysfunction   Musculoskeletal:  Positive for back pain (back of the  "neck). Negative for myalgias.   Skin:  Negative for wound.   Neurological:  Positive for weakness (Left upper extremity) and numbness (B/L upper extremities). Negative for dizziness.   Psychiatric/Behavioral:  Negative for confusion.         Objective   Objective:     /74 (BP Location: Right arm, Patient Position: Sitting, BP Cuff Size: Adult)   Pulse 84   Ht 1.651 m (5' 5\")   Wt 100 kg (220 lb 9.6 oz)   BMI 36.71 kg/m²     Physical Exam  Constitutional:       General: He is not in acute distress.     Appearance: He is obese.   HENT:      Head: Normocephalic and atraumatic.      Mouth/Throat:      Mouth: Mucous membranes are moist.   Eyes:      Conjunctiva/sclera: Conjunctivae normal.      Pupils: Pupils are equal, round, and reactive to light.   Cardiovascular:      Rate and Rhythm: Normal rate and regular rhythm.      Heart sounds: Normal heart sounds.   Pulmonary:      Effort: No respiratory distress.      Breath sounds: Normal breath sounds. No wheezing or rhonchi.   Abdominal:      General: Bowel sounds are normal.      Palpations: Abdomen is soft.      Tenderness: There is no abdominal tenderness.   Musculoskeletal:         General: No swelling.      Cervical back: Neck supple.   Skin:     General: Skin is warm and dry.   Neurological:      Mental Status: He is alert.      Sensory: Sensory deficit (left hand and right thumb decreased sensation) present.      Gait: Gait abnormal (Uses cane for assistance with ambulation).          Assessment & Plan:     Assessment/Plan     Problem List Items Addressed This Visit       Cervical spondylosis with myelopathy     -Will follow up with his neurosurgeon  -Will check folate and vitamin deficiency for neuropathy  -PT referral for osteoarthritic pains         Relevant Orders    Referral to Physical Therapy    Folate    Vitamin B12    COPD (chronic obstructive pulmonary disease) (Multi)     -Denies recent COPD exacerbations requiring hospitalization  -Controlled " with albuterol nebulizer and inhaler         Relevant Medications    albuterol 2.5 mg /3 mL (0.083 %) nebulizer solution    Essential hypertension     -Well-controlled with metoprolol succinate 50 mg, furosemide 20 mg, lisinopril 20 mg         Hypothyroidism     -Well-controlled with levothyroxine 75 mcg  -Repeat TSH ordered today         Relevant Medications    levothyroxine (Synthroid, Levoxyl) 75 mcg tablet    Type 2 diabetes mellitus with diabetic chronic kidney disease     -Continue metformin 500 home with evening meals  -Stable A1c, repeat today  -Follows up with endocrinologist         HLD (hyperlipidemia)     -Well-controlled with atorvastatin 80 mg  -Lipid panel repeat today         Relevant Medications    atorvastatin (Lipitor) 80 mg tablet    ezetimibe (Zetia) 10 mg tablet    Other Relevant Orders    Lipid Panel Non-Fasting    Cholesterol, LDL Direct    Erectile dysfunction due to diseases classified elsewhere     -Increased to Viagra 50 mg         Relevant Medications    sildenafil (Viagra) 50 mg tablet    Other Relevant Orders    Testosterone, total and free    HFrEF (heart failure with reduced ejection fraction) - Primary     -Last ejection fraction is 50-55%  - Continue home Metoprolol succinate 50 mg, lisinopril 20 mg         Relevant Medications    albuterol 90 mcg/actuation inhaler    furosemide (Lasix) 20 mg tablet    metoprolol succinate XL (Toprol-XL) 50 mg 24 hr tablet    sildenafil (Viagra) 50 mg tablet    Other Relevant Orders    CBC    Comprehensive Metabolic Panel    Hemoglobin A1C    Microscopic Only, Urine    Albumin-Creatinine Ratio, Urine Random    Vitamin D 25-Hydroxy,Total (for eval of Vitamin D levels)    Hemoglobin A1C    Lipid Panel Non-Fasting    Cholesterol, LDL Direct     Other Visit Diagnoses       Lower urinary tract symptoms (LUTS)        Relevant Medications    finasteride (Proscar) 5 mg tablet    Primary hypertension        Relevant Medications    furosemide (Lasix) 20 mg  tablet    lisinopril 20 mg tablet    Type 2 diabetes mellitus without complication, without long-term current use of insulin (Multi)        Relevant Medications    gabapentin (Neurontin) 300 mg capsule    metFORMIN  mg 24 hr tablet    OneTouch Delica Plus Lancet 30 gauge misc    OneTouch Ultra Test strip    Benign prostatic hyperplasia with incomplete bladder emptying        Relevant Medications    tamsulosin (Flomax) 0.4 mg 24 hr capsule    Hyperglycemia        Relevant Orders    Hemoglobin A1C    Hypovitaminosis D        Relevant Orders    Vitamin D 25-Hydroxy,Total (for eval of Vitamin D levels)    Elevated glucose        Relevant Orders    Hemoglobin A1C          #Health Maintenance:  - Routine labs today, next in 6 months (May)    Revisit Topics: ED (increased Viagra to 50 mg) and paraesthesia in both arms    Dispo: Patient is scheduled to return to clinic in March.    Nolberto Avilez, PGY-3  Internal Medicine     Disclaimer: Documentation completed with the information available at the time of input. The times in the chart may not be reflective of actual patient care times, interventions, or procedures. Documentation occurs after the physical care of the patient.

## 2024-11-23 LAB
TESTOSTERONE FREE (CHAN): 44 PG/ML (ref 30–135)
TESTOSTERONE,TOTAL,LC-MS/MS: 310 NG/DL (ref 250–1100)
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
## 2024-11-23 NOTE — ED PROVIDER NOTES
HPI   No chief complaint on file.      The has history of coronary disease status post stents x 2, CHF, COPD who presents with diaphoresis and shortness of breath.  He is planned cominf from a party did not drink any alcohol.  Does take some home narcotics.  Patient was complaining shortness of breath.  However on arrival he is nonverbal nonresponsive and in distress      History limited by:  Mental status change, patient nonverbal and severe respiratory distress          Patient History   Past Medical History:   Diagnosis Date    Acute carpal tunnel syndrome, right 05/26/2023    Body mass index (BMI) 35.0-35.9, adult 03/26/2021    Body mass index (BMI) of 35.0 to 35.9    Body mass index (BMI) 36.0-36.9, adult 10/01/2021    Body mass index (BMI) of 36.0 to 36.9    Body mass index (BMI) 37.0-37.9, adult 08/29/2022    Body mass index (BMI) of 37.0 to 37.9 in adult    Body mass index (BMI) 37.0-37.9, adult 10/09/2020    BMI 37.0-37.9, adult    Body mass index (BMI) 38.0-38.9, adult 01/19/2021    Body mass index (BMI) of 38.0 to 38.9 in adult    Body mass index (BMI) 38.0-38.9, adult 08/14/2020    Body mass index (BMI) of 38.0 to 38.9 in adult    Body mass index (BMI) 39.0-39.9, adult 07/09/2020    Body mass index (BMI) of 39.0 to 39.9 in adult    Body mass index (BMI)40.0-44.9, adult 12/31/2019    Body mass index (BMI) of 40.0 to 44.9 in adult    Essential (primary) hypertension 03/27/2013    Benign essential hypertension    Furuncle of limb, unspecified 01/27/2014    Furuncle of axilla    Hyperlipidemia, unspecified 05/20/2022    Hyperlipidemia    Inflammatory disorders of scrotum 10/25/2017    Scrotal abscess    Morbid (severe) obesity due to excess calories (Multi) 08/29/2022    Class 2 severe obesity with serious comorbidity and body mass index (BMI) of 37.0 to 37.9 in adult, unspecified obesity type    Morbid (severe) obesity due to excess calories (Multi) 05/20/2022    Class 2 severe obesity with serious  comorbidity and body mass index (BMI) of 36.0 to 36.9 in adult, unspecified obesity type    Obesity, unspecified 10/01/2021    Class 2 obesity in adult    Other conditions influencing health status     Acute Myocardial Infarction    Personal history of diseases of the blood and blood-forming organs and certain disorders involving the immune mechanism 08/02/2017    History of iron deficiency anemia    Personal history of other diseases of the digestive system     History of hiatal hernia    Personal history of other diseases of the nervous system and sense organs     History of glaucoma    Personal history of other endocrine, nutritional and metabolic disease     History of thyroid disease    Personal history of other endocrine, nutritional and metabolic disease 11/15/2022    History of Graves' disease    Personal history of other endocrine, nutritional and metabolic disease 05/11/2015    History of type 2 diabetes mellitus    Rash and other nonspecific skin eruption 01/17/2014    Rash    Solitary pulmonary nodule 11/27/2018    Lung nodule     Past Surgical History:   Procedure Laterality Date    CARDIAC CATHETERIZATION  06/02/2013    Cardiac Cath Procedure Summary    COLONOSCOPY  06/06/2017    Complete Colonoscopy    HERNIA REPAIR  06/02/2013    Hernia Repair    MR HEAD ANGIO WO IV CONTRAST  1/11/2021    MR HEAD ANGIO WO IV CONTRAST 1/11/2021 [facility]    MR NECK ANGIO WO IV CONTRAST  1/11/2021    MR NECK ANGIO WO IV CONTRAST 1/11/2021 [facility]    OTHER SURGICAL HISTORY  06/02/2013    Cardiac Cath Lesion 1, 1st Adjunct Treat Device: Stent    OTHER SURGICAL HISTORY  11/29/2021    Cervical laminectomy    OTHER SURGICAL HISTORY  03/03/2021    Posterior cervical vertebral fusion     Family History   Problem Relation Name Age of Onset    Heart disease Mother      Thyroid disease Mother      Colon cancer Father      Kidney failure Father      Thyroid disease Sister      Other (grand mal seizures)  Sister      Heart attack Brother      Prostate cancer Other       Social History     Tobacco Use    Smoking status: Never    Smokeless tobacco: Never   Substance Use Topics    Alcohol use: Not Currently    Drug use: Never       Physical Exam   ED Triage Vitals   Temp Heart Rate Respirations BP   -- 11/22/24 2021 11/22/24 2025 11/22/24 2019    (!) 115 13 (!) 97/37      Pulse Ox Temp src Heart Rate Source Patient Position   11/22/24 2022 -- -- --   (!) 93 %         BP Location FiO2 (%)     -- --             Physical Exam  Vitals and nursing note reviewed.   Constitutional:       General: He is in acute distress.      Appearance: He is well-developed. He is ill-appearing, toxic-appearing and diaphoretic.   HENT:      Head: Normocephalic and atraumatic.   Eyes:      Conjunctiva/sclera: Conjunctivae normal.   Cardiovascular:      Rate and Rhythm: Regular rhythm. Tachycardia present.      Heart sounds: No murmur heard.  Pulmonary:      Effort: Pulmonary effort is normal. No respiratory distress.      Breath sounds: Examination of the right-upper field reveals decreased breath sounds. Examination of the left-upper field reveals decreased breath sounds. Examination of the right-lower field reveals decreased breath sounds. Examination of the left-lower field reveals decreased breath sounds. Decreased breath sounds present.   Abdominal:      Palpations: Abdomen is soft.      Tenderness: There is no abdominal tenderness.   Musculoskeletal:         General: No swelling.      Cervical back: Neck supple.      Right lower leg: Edema present.      Left lower leg: Edema present.   Skin:     General: Skin is warm.      Capillary Refill: Capillary refill takes less than 2 seconds.   Neurological:      Mental Status: He is alert. He is disoriented.      Comments: The patient is unable to speak but is alert looking around pulling at the nonrebreather           ED Course & MDM   Diagnoses as of 11/24/24 0208   ST elevation myocardial  infarction (STEMI), unspecified artery (Multi)   Cardiopulmonary arrest (Multi)                 No data recorded                                 Medical Decision Making  On arrival the patient was satting 80% on a nonrebreather with a low blood pressure.  Did attempt to have fluids and phenylephrine ready at time of intubation.  His GCS was 8  minimally responsive.  Elected to intubate despite low blood pressure.  The patient had a PEA arrest after intubation.  Multiple rounds epinephrine.  The patient did have some ROSC.  He is post ROSC EKG at approximately 2058 showed an inferior STEMI in leads II, III, aVF.  I spoke to cardiology who thinks with his PEA arrest that seems somewhat less likely to be a STEMI.  Patient had another arrest.  Based on he had ROSC again.  The patient appeared to have complete heart block to me with P waves not matching up with his RR interval.  I did transcutaneously pace the patient.  The patient was started on 3 pressors.  Cardiologist is in room evaluates patient do not feel the patient would be safe to transfer to Cath Lab.  Patient had another PEA arrest.  2 more rounds epinephrine had asystole at 2149 a time of death was called.  Procedure  Critical Care    Performed by: Pelon House MD  Authorized by: Pelon House MD    Critical care provider statement:     Critical care time (minutes):  90    Critical care time was exclusive of:  Separately billable procedures and treating other patients    Critical care was necessary to treat or prevent imminent or life-threatening deterioration of the following conditions:  Cardiac failure, circulatory failure, shock and respiratory failure    Critical care was time spent personally by me on the following activities:  Blood draw for specimens, ordering and performing treatments and interventions, development of treatment plan with patient or surrogate, ordering and review of laboratory studies, discussions with consultants,  ordering and review of radiographic studies, pulse oximetry, evaluation of patient's response to treatment, examination of patient, interpretation of cardiac output measurements, obtaining history from patient or surrogate, ventilator management, transcutaneous pacing, re-evaluation of patient's condition and review of old charts  Intubation    Performed by: Pelon House MD  Authorized by: Pelon House MD    Consent:     Consent obtained:  Emergent situation  Pre-procedure details:     Indications: altered consciousness, cardio/pulmonary arrest and respiratory failure      Patient status:  Unresponsive    Look externally: no concerns      Mouth opening - incisor distance:  3 or more finger widths    Hyoid-mental distance: 3 or more finger widths      Mallampati score:  I    Obstruction: none      Neck mobility: normal      Pharmacologic strategy: none      Induction agents:  None    Paralytics:  None  Procedure details:     Preoxygenation:  Bag valve mask    CPR in progress: yes      Number of attempts:  1  Successful intubation attempt details:     Intubation method:  Oral    Intubation technique: direct      Laryngoscope blade:  Hypercurved    Bougie used: no      Grade view: I      Tube size (mm):  7.5    Tube type:  Cuffed    Tube visualized through cords: yes    Placement assessment:     ETT at teeth/gumline (cm):  25    Tube secured with:  ETT mcdonadl    Breath sounds:  Equal    Placement verification: chest rise, colorimetric ETCO2, direct visualization, equal breath sounds, numeric ETCO2 and waveform ETCO2    Post-procedure details:     Complications: cardiac arrest    Comments:      Patient had cardiac arrest       Pelon House MD  11/24/24 0210       Pelon House MD  01/14/25 0310

## 2024-11-23 NOTE — PROGRESS NOTES
A STEMI was activated by the ED and initial ECG was suggestive of inferior STEMI.     As we arrived to the [facility] ED, the patient was on a ventilator and he was being transcutaneously paced. Discussion with family revealed that patient was found unresponsive in his home and brought to the ED. He had a PEA arrest and ACLS was carried out for greater than 45 minutes. There had been no shockable rhythm.    Cuff pressure was 50/30 on maximum doses of three pressors and TTE showed no cardiac movement of the LV or RV. There was no palpable pulse. CPR was initiated with the MICK device and patient was not stable enough for cardiac catheterization or transport. Labs were sent.

## 2024-11-25 LAB
ATRIAL RATE: 121 BPM
ATRIAL RATE: 133 BPM
P AXIS: 61 DEGREES
P AXIS: 76 DEGREES
P OFFSET: 181 MS
P OFFSET: 181 MS
P ONSET: 110 MS
P ONSET: 130 MS
PR INTERVAL: 170 MS
PR INTERVAL: 240 MS
Q ONSET: 215 MS
Q ONSET: 230 MS
QRS COUNT: 20 BEATS
QRS COUNT: 22 BEATS
QRS DURATION: 74 MS
QRS DURATION: 78 MS
QT INTERVAL: 220 MS
QT INTERVAL: 282 MS
QTC CALCULATION(BAZETT): 312 MS
QTC CALCULATION(BAZETT): 419 MS
QTC FREDERICIA: 277 MS
QTC FREDERICIA: 367 MS
R AXIS: 61 DEGREES
R AXIS: 78 DEGREES
T AXIS: 89 DEGREES
T AXIS: 97 DEGREES
T OFFSET: 340 MS
T OFFSET: 356 MS
VENTRICULAR RATE: 121 BPM
VENTRICULAR RATE: 133 BPM

## 2025-01-16 ENCOUNTER — APPOINTMENT (OUTPATIENT)
Dept: UROLOGY | Facility: HOSPITAL | Age: 85
End: 2025-01-16
Payer: MEDICARE

## 2025-02-18 ENCOUNTER — APPOINTMENT (OUTPATIENT)
Dept: CARDIOLOGY | Facility: HOSPITAL | Age: 85
End: 2025-02-18
Payer: MEDICARE

## 2025-03-18 ENCOUNTER — APPOINTMENT (OUTPATIENT)
Dept: PRIMARY CARE | Facility: CLINIC | Age: 85
End: 2025-03-18
Payer: MEDICARE

## 2025-10-20 ENCOUNTER — APPOINTMENT (OUTPATIENT)
Dept: ENDOCRINOLOGY | Facility: CLINIC | Age: 85
End: 2025-10-20
Payer: MEDICARE